# Patient Record
Sex: FEMALE | Race: WHITE | NOT HISPANIC OR LATINO | URBAN - METROPOLITAN AREA
[De-identification: names, ages, dates, MRNs, and addresses within clinical notes are randomized per-mention and may not be internally consistent; named-entity substitution may affect disease eponyms.]

---

## 2019-12-31 ENCOUNTER — EMERGENCY (EMERGENCY)
Facility: HOSPITAL | Age: 39
LOS: 1 days | Discharge: HOME | End: 2019-12-31
Attending: EMERGENCY MEDICINE | Admitting: EMERGENCY MEDICINE
Payer: MEDICAID

## 2019-12-31 VITALS
HEIGHT: 65 IN | TEMPERATURE: 98 F | RESPIRATION RATE: 18 BRPM | HEART RATE: 98 BPM | SYSTOLIC BLOOD PRESSURE: 128 MMHG | DIASTOLIC BLOOD PRESSURE: 84 MMHG | WEIGHT: 145.06 LBS

## 2019-12-31 PROCEDURE — 99284 EMERGENCY DEPT VISIT MOD MDM: CPT

## 2019-12-31 NOTE — ED PROVIDER NOTE - PATIENT PORTAL LINK FT
You can access the FollowMyHealth Patient Portal offered by [facility] by registering at the following website: [email or web address]. By joining [employer]’s FollowMyHealth portal, you will also be able to view your health information using other applications (apps) compatible with our system.
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
## 2020-01-01 VITALS
HEART RATE: 97 BPM | TEMPERATURE: 98 F | OXYGEN SATURATION: 98 % | DIASTOLIC BLOOD PRESSURE: 67 MMHG | RESPIRATION RATE: 18 BRPM | SYSTOLIC BLOOD PRESSURE: 102 MMHG

## 2020-01-07 DIAGNOSIS — R41.82 ALTERED MENTAL STATUS, UNSPECIFIED: ICD-10-CM

## 2020-01-07 DIAGNOSIS — F10.129 ALCOHOL ABUSE WITH INTOXICATION, UNSPECIFIED: ICD-10-CM
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
## 2023-12-14 ENCOUNTER — EMERGENCY (EMERGENCY)
Facility: HOSPITAL | Age: 43
LOS: 1 days | Discharge: HOME | End: 2023-12-14
Attending: EMERGENCY MEDICINE | Admitting: EMERGENCY MEDICINE
Payer: MEDICAID

## 2023-12-14 VITALS
DIASTOLIC BLOOD PRESSURE: 78 MMHG | RESPIRATION RATE: 18 BRPM | HEART RATE: 84 BPM | OXYGEN SATURATION: 98 % | SYSTOLIC BLOOD PRESSURE: 122 MMHG | TEMPERATURE: 98 F

## 2023-12-14 DIAGNOSIS — F10.120 ALCOHOL ABUSE WITH INTOXICATION, UNCOMPLICATED: ICD-10-CM

## 2023-12-14 DIAGNOSIS — R41.82 ALTERED MENTAL STATUS, UNSPECIFIED: ICD-10-CM

## 2023-12-14 PROCEDURE — 99283 EMERGENCY DEPT VISIT LOW MDM: CPT | Mod: 25

## 2023-12-14 NOTE — ED PROVIDER NOTE - NSFOLLOWUPINSTRUCTIONS_ED_ALL_ED_FT
Alcohol Intoxication    WHAT YOU NEED TO KNOW:    Alcohol intoxication is a harmful physical condition caused when you drink more alcohol than your body can handle. It is also called ethanol poisoning, or being drunk.    DISCHARGE INSTRUCTIONS:    Call your local emergency number (911 in the ) if:   •You have sudden trouble breathing or chest pain.      •You have a seizure.      •You feel sad enough to harm yourself or others.      Call your doctor if:   •You have hallucinations (you see or hear things that are not real).      •You cannot stop vomiting.      •You have questions or concerns about your condition or care.      Recommended alcohol limits:   •Men 21 to 64 years should limit alcohol to 2 drinks a day. Do not have more than 4 drinks in 1 day or more than 14 in 1 week.      •All women, and men 65 or older should limit alcohol to 1 drink in a day. Do not have more than 3 drinks in 1 day or more than 7 in 1 week. No amount of alcohol is okay during pregnancy.      Manage alcohol use:   •Decrease the amount you drink. This can help prevent health problems such as brain, heart, and liver damage, high blood pressure, diabetes, and cancer. If you cannot stop completely, healthcare providers can help you set goals to decrease the amount you drink.      •Plan weekly alcohol use. You will be less likely to drink more than the recommended limit if you plan ahead.      •Have food when you drink alcohol. Food will prevent alcohol from getting into your system too quickly. Eat before you have your first alcohol drink.      •Time your drinks carefully. Have no more than 1 drink in an hour. Have a liquid such as water, coffee, or a soft drink between alcohol drinks.      •Do not drive if you have had alcohol. Make sure someone who has not been drinking can help you get home.      •Do not drink alcohol if you are taking medicine. Alcohol is dangerous when you combine it with certain medicines, such as acetaminophen or blood pressure medicine. Talk to your healthcare provider about all the medicines you currently take.      For more information:   •Alcoholics Anonymous  Web Address: http://www.aa.org      •Substance Abuse and Mental Health Services Administration  [address]  [place] [address]  Web Address: http://www.[facility].gov        Follow up with your healthcare provider as directed: Write down your questions so you remember to ask them during your visits. Alcohol Intoxication    WHAT YOU NEED TO KNOW:    Alcohol intoxication is a harmful physical condition caused when you drink more alcohol than your body can handle. It is also called ethanol poisoning, or being drunk.    DISCHARGE INSTRUCTIONS:    Call your local emergency number (911 in the ) if:   •You have sudden trouble breathing or chest pain.      •You have a seizure.      •You feel sad enough to harm yourself or others.      Call your doctor if:   •You have hallucinations (you see or hear things that are not real).      •You cannot stop vomiting.      •You have questions or concerns about your condition or care.      Recommended alcohol limits:   •Men 21 to 64 years should limit alcohol to 2 drinks a day. Do not have more than 4 drinks in 1 day or more than 14 in 1 week.      •All women, and men 65 or older should limit alcohol to 1 drink in a day. Do not have more than 3 drinks in 1 day or more than 7 in 1 week. No amount of alcohol is okay during pregnancy.      Manage alcohol use:   •Decrease the amount you drink. This can help prevent health problems such as brain, heart, and liver damage, high blood pressure, diabetes, and cancer. If you cannot stop completely, healthcare providers can help you set goals to decrease the amount you drink.      •Plan weekly alcohol use. You will be less likely to drink more than the recommended limit if you plan ahead.      •Have food when you drink alcohol. Food will prevent alcohol from getting into your system too quickly. Eat before you have your first alcohol drink.      •Time your drinks carefully. Have no more than 1 drink in an hour. Have a liquid such as water, coffee, or a soft drink between alcohol drinks.      •Do not drive if you have had alcohol. Make sure someone who has not been drinking can help you get home.      •Do not drink alcohol if you are taking medicine. Alcohol is dangerous when you combine it with certain medicines, such as acetaminophen or blood pressure medicine. Talk to your healthcare provider about all the medicines you currently take.      For more information:   •Alcoholics Anonymous  Web Address: http://www.aa.org      •Substance Abuse and Mental Health Services Administration  [address]  [place] [address]  Web Address: http://www.[email or web address].[email or web address]        Follow up with your healthcare provider as directed: Write down your questions so you remember to ask them during your visits.

## 2023-12-14 NOTE — ED ADULT NURSE NOTE - NSFALLUNIVINTERV_ED_ALL_ED
Bed/Stretcher in lowest position, wheels locked, appropriate side rails in place/Call bell, personal items and telephone in reach/Instruct patient to call for assistance before getting out of bed/chair/stretcher/Non-slip footwear applied when patient is off stretcher/[place] to call system/Physically safe environment - no spills, clutter or unnecessary equipment/Purposeful proactive rounding/Room/bathroom lighting operational, light cord in reach Bed/Stretcher in lowest position, wheels locked, appropriate side rails in place/Call bell, personal items and telephone in reach/Instruct patient to call for assistance before getting out of bed/chair/stretcher/Non-slip footwear applied when patient is off stretcher/[place] to call system/Physically safe environment - no spills, clutter or unnecessary equipment/Purposeful proactive rounding/Room/bathroom lighting operational, light cord in reach

## 2023-12-14 NOTE — ED PROVIDER NOTE - PATIENT PORTAL LINK FT
You can access the FollowMyHealth Patient Portal offered by [facility] by registering at the following website: [email or web address]. By joining [employer]’s FollowMyHealth portal, you will also be able to view your health information using other applications (apps) compatible with our system. You can access the FollowMyHealth Patient Portal offered by [facility] by registering at the following website: [email or web address]. By joining [employer]’s FollowMyHealth portal, you will also be able to view your health information using other applications (apps) compatible with our system.

## 2023-12-14 NOTE — ED ADULT NURSE NOTE - CAS DISCH CONDITION
What Type Of Note Output Would You Prefer (Optional)?: Bullet Format Hpi Title: Evaluation of Skin Lesions How Severe Are Your Spot(S)?: mild Have Your Spot(S) Been Treated In The Past?: has not been treated Stable
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
## 2023-12-14 NOTE — ED PROVIDER NOTE - PHYSICAL EXAMINATION
Constitutional: awake and alert, in no acute distress  HEENT: head normocephalic and atraumatic. moist mucous membranes  Eyes: extraocular movements intact, normal conjunctiva  Neck: supple, normal ROM  Cardiovascular: regular rate   Pulmonary: no respiratory distress  Gastrointestinal: abdomen flat and nondistended  Skin: warm, dry, normal for ethnicity  Musculoskeletal: no edema, no deformity  Neurological: oriented x4, no focal neurologic deficit. ambulatory w steady gait  Psychiatric: calm and cooperative

## 2024-10-19 ENCOUNTER — EMERGENCY (EMERGENCY)
Facility: HOSPITAL | Age: 44
LOS: 1 days | Discharge: HOME | End: 2024-10-19
Admitting: EMERGENCY MEDICINE
Payer: MEDICAID

## 2024-10-19 VITALS
HEART RATE: 105 BPM | SYSTOLIC BLOOD PRESSURE: 117 MMHG | HEIGHT: 69 IN | OXYGEN SATURATION: 95 % | WEIGHT: 134.92 LBS | TEMPERATURE: 99 F | RESPIRATION RATE: 18 BRPM | DIASTOLIC BLOOD PRESSURE: 78 MMHG

## 2024-10-19 PROCEDURE — 70486 CT MAXILLOFACIAL W/O DYE: CPT | Mod: 26,MC

## 2024-10-19 PROCEDURE — 99284 EMERGENCY DEPT VISIT MOD MDM: CPT

## 2024-10-19 PROCEDURE — 70450 CT HEAD/BRAIN W/O DYE: CPT | Mod: 26,MC

## 2024-10-19 NOTE — ED ADULT NURSE NOTE - NSFALLUNIVINTERV_ED_ALL_ED
Bed/Stretcher in lowest position, wheels locked, appropriate side rails in place/Call bell, personal items and telephone in reach/Instruct patient to call for assistance before getting out of bed/chair/stretcher/Non-slip footwear applied when patient is off stretcher/[place] to call system/Physically safe environment - no spills, clutter or unnecessary equipment/Purposeful proactive rounding/Room/bathroom lighting operational, light cord in reach

## 2024-10-19 NOTE — ED ADULT TRIAGE NOTE - CHIEF COMPLAINT QUOTE
pt BIBA c/o left sided facial bruising after walking into a door, pt states it was dark and she didn't realize door was in front of her. pt BIBA c/o left sided facial bruising after walking into a door 2 days ago, pt states it was dark and she didn't realize door was in front of her.

## 2024-10-19 NOTE — ED PROVIDER NOTE - NSFOLLOWUPINSTRUCTIONS_ED_ALL_ED_FT
PLEASE FOLLOW-UP WITH YOUR PRIMARY CARE DOCTOR IN 1-2 DAYS FOR FURTHER EVALUATION.      PLEASE TAKE ALL PAPERWORK FROM TODAY'S VISIT TO YOUR PRIMARY DOCTOR.     IF YOU DO NOT HAVE A PRIMARY CARE DOCTOR PLEASE REFER TO THE OFFICE/CLINIC INFORMATION GIVEN BELOW:    If you do not have a doctor, you can call our referral line to find a doctor that matches your insurance; the number is [phone].     You can also follow up with clinics listed below, if you do not have a doctor:  [address] [address]  To make an appointment, call [phone]    [facility]  Address: [address] [place] [address]  Appointment Center: [phone] ([phone])     To access your record on the patient portal [facility], please visit:  [email or web address]  If you are having difficulties setting this up, call [phone] and someone can assist you over the phone.     PLEASE RETURN TO THE ER IMMEDIATELY OR CALL [phone] ANY HIGH FEVER, CHEST PAIN, TROUBLE BREATHING, VOMITING, SEVERE PAIN, OR ANY OTHER CONCERNS

## 2024-10-19 NOTE — ED PROVIDER NOTE - PHYSICAL EXAMINATION
CONSTITUTIONAL: Well-appearing; well-nourished; in no apparent distress.   	HEAD: Normocephalic; atraumatic.   	EYES:  conjunctiva and sclera clear. +left periorbital ecchymosis, no facial tenderness. +EOM Intact. good VA.   	ENT: normal nose; no rhinorrhea; normal pharynx with no erythema or lesions.   	NECK: Supple; non-tender;   	CARDIOVASCULAR: Normal S1, S2; no murmurs, rubs, or gallops. Regular rate and rhythm.   	RESPIRATORY: Breathing easily; breath sounds clear and equal bilaterally; no wheezes, rhonchi, or rales.  	GI: Soft; non-distended; non-tender  	EXT: CHAHAL x 4. normal gait.   	SKIN: Normal for age and race; warm; dry; good turgor; no apparent lesions or rash.   	NEURO: A & O x 3; face symmetric; grossly unremarkable.   PSYCHOLOGICAL: The patient’s mood and manner are appropriate.

## 2024-10-19 NOTE — ED PROVIDER NOTE - CLINICAL SUMMARY MEDICAL DECISION MAKING FREE TEXT BOX
s/p mechanical fall 2 days ago with left periorbital bruising most likely contusion, no neuro/motor deficits, will obtain ct brain r/o bleed, ct maxillofacial r/o fracture.     //ct imaging shows no acute bleed or fracture. we discussed supportive care, f/u pcp

## 2024-10-19 NOTE — ED ADULT NURSE NOTE - CHIEF COMPLAINT QUOTE
pt BIBA c/o left sided facial bruising after walking into a door 2 days ago, pt states it was dark and she didn't realize door was in front of her.

## 2024-10-19 NOTE — ED PROVIDER NOTE - PATIENT PORTAL LINK FT
You can access the FollowMyHealth Patient Portal offered by [facility] by registering at the following website: [email or web address]. By joining [employer]’s FollowMyHealth portal, you will also be able to view your health information using other applications (apps) compatible with our system.

## 2024-10-21 DIAGNOSIS — S00.12XA CONTUSION OF LEFT EYELID AND PERIOCULAR AREA, INITIAL ENCOUNTER: ICD-10-CM
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
## 2024-10-28 ENCOUNTER — INPATIENT (INPATIENT)
Facility: HOSPITAL | Age: 44
LOS: 2 days | Discharge: HOME | DRG: 433 | End: 2024-10-31
Attending: STUDENT IN AN ORGANIZED HEALTH CARE EDUCATION/TRAINING PROGRAM | Admitting: STUDENT IN AN ORGANIZED HEALTH CARE EDUCATION/TRAINING PROGRAM
Payer: MEDICAID

## 2024-10-28 VITALS
WEIGHT: 130.07 LBS | OXYGEN SATURATION: 98 % | RESPIRATION RATE: 16 BRPM | HEART RATE: 102 BPM | TEMPERATURE: 98 F | HEIGHT: 69 IN | DIASTOLIC BLOOD PRESSURE: 74 MMHG | SYSTOLIC BLOOD PRESSURE: 116 MMHG

## 2024-10-28 DIAGNOSIS — R07.9 CHEST PAIN, UNSPECIFIED: ICD-10-CM

## 2024-10-28 DIAGNOSIS — Z87.898 PERSONAL HISTORY OF OTHER SPECIFIED CONDITIONS: ICD-10-CM

## 2024-10-28 DIAGNOSIS — R79.89 OTHER SPECIFIED ABNORMAL FINDINGS OF BLOOD CHEMISTRY: ICD-10-CM

## 2024-10-28 DIAGNOSIS — Z29.9 ENCOUNTER FOR PROPHYLACTIC MEASURES, UNSPECIFIED: ICD-10-CM

## 2024-10-28 DIAGNOSIS — R74.8 ABNORMAL LEVELS OF OTHER SERUM ENZYMES: ICD-10-CM

## 2024-10-28 DIAGNOSIS — D64.9 ANEMIA, UNSPECIFIED: ICD-10-CM

## 2024-10-28 DIAGNOSIS — Z91.89 OTHER SPECIFIED PERSONAL RISK FACTORS, NOT ELSEWHERE CLASSIFIED: ICD-10-CM

## 2024-10-28 DIAGNOSIS — K74.60 UNSPECIFIED CIRRHOSIS OF LIVER: ICD-10-CM

## 2024-10-28 LAB
ADD ON TEST-SPECIMEN IN LAB: SIGNIFICANT CHANGE UP
ALBUMIN SERPL ELPH-MCNC: 2.4 G/DL — LOW (ref 3.4–5)
ALP SERPL-CCNC: 224 U/L — HIGH (ref 40–120)
ALT FLD-CCNC: 23 U/L — SIGNIFICANT CHANGE UP (ref 12–42)
ANION GAP SERPL CALC-SCNC: 7 MMOL/L — LOW (ref 9–16)
ANISOCYTOSIS BLD QL: SIGNIFICANT CHANGE UP
AST SERPL-CCNC: 153 U/L — HIGH (ref 15–37)
BASOPHILS # BLD AUTO: 0.13 K/UL — SIGNIFICANT CHANGE UP (ref 0–0.2)
BASOPHILS NFR BLD AUTO: 2 % — SIGNIFICANT CHANGE UP (ref 0–2)
BILIRUB SERPL-MCNC: 2.5 MG/DL — HIGH (ref 0.2–1.2)
BUN SERPL-MCNC: 4 MG/DL — LOW (ref 7–23)
CALCIUM SERPL-MCNC: 7.9 MG/DL — LOW (ref 8.5–10.5)
CHLORIDE SERPL-SCNC: 106 MMOL/L — SIGNIFICANT CHANGE UP (ref 96–108)
CO2 SERPL-SCNC: 28 MMOL/L — SIGNIFICANT CHANGE UP (ref 22–31)
CREAT SERPL-MCNC: 0.58 MG/DL — SIGNIFICANT CHANGE UP (ref 0.5–1.3)
D DIMER BLD IA.RAPID-MCNC: 1464 NG/ML DDU — HIGH
EGFR: 114 ML/MIN/1.73M2 — SIGNIFICANT CHANGE UP
EOSINOPHIL # BLD AUTO: 0.1 K/UL — SIGNIFICANT CHANGE UP (ref 0–0.5)
EOSINOPHIL NFR BLD AUTO: 1.6 % — SIGNIFICANT CHANGE UP (ref 0–6)
ETHANOL SERPL-MCNC: 472 MG/DL — HIGH
GLUCOSE SERPL-MCNC: 88 MG/DL — SIGNIFICANT CHANGE UP (ref 70–99)
HCG SERPL-ACNC: 5 MIU/ML — HIGH
HCT VFR BLD CALC: 30.8 % — LOW (ref 34.5–45)
HGB BLD-MCNC: 10.3 G/DL — LOW (ref 11.5–15.5)
IMM GRANULOCYTES NFR BLD AUTO: 0.2 % — SIGNIFICANT CHANGE UP (ref 0–0.9)
LIDOCAIN IGE QN: 94 U/L — HIGH (ref 16–77)
LYMPHOCYTES # BLD AUTO: 2.21 K/UL — SIGNIFICANT CHANGE UP (ref 1–3.3)
LYMPHOCYTES # BLD AUTO: 34.5 % — SIGNIFICANT CHANGE UP (ref 13–44)
MACROCYTES BLD QL: SIGNIFICANT CHANGE UP
MAGNESIUM SERPL-MCNC: 1.6 MG/DL — SIGNIFICANT CHANGE UP (ref 1.6–2.6)
MANUAL SMEAR VERIFICATION: SIGNIFICANT CHANGE UP
MCHC RBC-ENTMCNC: 33.4 GM/DL — SIGNIFICANT CHANGE UP (ref 32–36)
MCHC RBC-ENTMCNC: 37.5 PG — HIGH (ref 27–34)
MCV RBC AUTO: 112 FL — HIGH (ref 80–100)
MONOCYTES # BLD AUTO: 0.86 K/UL — SIGNIFICANT CHANGE UP (ref 0–0.9)
MONOCYTES NFR BLD AUTO: 13.4 % — SIGNIFICANT CHANGE UP (ref 2–14)
NEUTROPHILS # BLD AUTO: 3.09 K/UL — SIGNIFICANT CHANGE UP (ref 1.8–7.4)
NEUTROPHILS NFR BLD AUTO: 48.3 % — SIGNIFICANT CHANGE UP (ref 43–77)
NRBC # BLD: 0 /100 WBCS — SIGNIFICANT CHANGE UP (ref 0–0)
NT-PROBNP SERPL-SCNC: 54 PG/ML — SIGNIFICANT CHANGE UP
PLAT MORPH BLD: NORMAL — SIGNIFICANT CHANGE UP
PLATELET # BLD AUTO: 217 K/UL — SIGNIFICANT CHANGE UP (ref 150–400)
PLATELET COUNT - ESTIMATE: NORMAL — SIGNIFICANT CHANGE UP
POTASSIUM SERPL-MCNC: 3.6 MMOL/L — SIGNIFICANT CHANGE UP (ref 3.5–5.3)
POTASSIUM SERPL-SCNC: 3.6 MMOL/L — SIGNIFICANT CHANGE UP (ref 3.5–5.3)
PROT SERPL-MCNC: 7.6 G/DL — SIGNIFICANT CHANGE UP (ref 6.4–8.2)
RBC # BLD: 2.75 M/UL — LOW (ref 3.8–5.2)
RBC # FLD: 14 % — SIGNIFICANT CHANGE UP (ref 10.3–14.5)
RBC BLD AUTO: ABNORMAL
SODIUM SERPL-SCNC: 141 MMOL/L — SIGNIFICANT CHANGE UP (ref 132–145)
TROPONIN I, HIGH SENSITIVITY RESULT: 5 NG/L — SIGNIFICANT CHANGE UP
TROPONIN T, HIGH SENSITIVITY RESULT: 7 NG/L — SIGNIFICANT CHANGE UP (ref 0–51)
WBC # BLD: 6.4 K/UL — SIGNIFICANT CHANGE UP (ref 3.8–10.5)
WBC # FLD AUTO: 6.4 K/UL — SIGNIFICANT CHANGE UP (ref 3.8–10.5)

## 2024-10-28 PROCEDURE — 71275 CT ANGIOGRAPHY CHEST: CPT | Mod: 26,MC

## 2024-10-28 PROCEDURE — 99285 EMERGENCY DEPT VISIT HI MDM: CPT | Mod: 25

## 2024-10-28 PROCEDURE — 71046 X-RAY EXAM CHEST 2 VIEWS: CPT | Mod: 26

## 2024-10-28 PROCEDURE — 99223 1ST HOSP IP/OBS HIGH 75: CPT

## 2024-10-28 RX ORDER — SPIRONOLACTONE 100 MG
25 TABLET ORAL EVERY 24 HOURS
Refills: 0 | Status: DISCONTINUED | OUTPATIENT
Start: 2024-10-28 | End: 2024-10-29

## 2024-10-28 RX ORDER — B-COMPLEX WITH VITAMIN C
1 VIAL (ML) INJECTION DAILY
Refills: 0 | Status: DISCONTINUED | OUTPATIENT
Start: 2024-10-28 | End: 2024-10-31

## 2024-10-28 RX ORDER — CHLORDIAZEPOXIDE HCL 10 MG
50 CAPSULE ORAL ONCE
Refills: 0 | Status: DISCONTINUED | OUTPATIENT
Start: 2024-10-28 | End: 2024-10-28

## 2024-10-28 RX ORDER — FOLIC ACID 1 MG/1
1 TABLET ORAL ONCE
Refills: 0 | Status: COMPLETED | OUTPATIENT
Start: 2024-10-28 | End: 2024-10-28

## 2024-10-28 RX ORDER — ENOXAPARIN SODIUM 40MG/0.4ML
40 SYRINGE (ML) SUBCUTANEOUS EVERY 24 HOURS
Refills: 0 | Status: DISCONTINUED | OUTPATIENT
Start: 2024-10-28 | End: 2024-10-31

## 2024-10-28 RX ORDER — INFLUENZ VIR VAC TV P-SURF2003 15MCG/.5ML
0.5 SYRINGE (ML) INTRAMUSCULAR ONCE
Refills: 0 | Status: DISCONTINUED | OUTPATIENT
Start: 2024-10-28 | End: 2024-10-31

## 2024-10-28 RX ORDER — FUROSEMIDE 40 MG
20 TABLET ORAL EVERY 24 HOURS
Refills: 0 | Status: DISCONTINUED | OUTPATIENT
Start: 2024-10-28 | End: 2024-10-31

## 2024-10-28 RX ORDER — LORAZEPAM 2 MG
1 TABLET ORAL
Refills: 0 | DISCHARGE

## 2024-10-28 RX ORDER — LORAZEPAM 2 MG
1 TABLET ORAL AT BEDTIME
Refills: 0 | Status: DISCONTINUED | OUTPATIENT
Start: 2024-10-28 | End: 2024-10-31

## 2024-10-28 RX ORDER — KETOROLAC TROMETHAMINE 30 MG/ML
15 INJECTION INTRAMUSCULAR; INTRAVENOUS ONCE
Refills: 0 | Status: DISCONTINUED | OUTPATIENT
Start: 2024-10-28 | End: 2024-10-28

## 2024-10-28 RX ORDER — MAGNESIUM SULFATE IN 0.9% NACL 2 G/50 ML
2 INTRAVENOUS SOLUTION, PIGGYBACK (ML) INTRAVENOUS ONCE
Refills: 0 | Status: COMPLETED | OUTPATIENT
Start: 2024-10-28 | End: 2024-10-28

## 2024-10-28 RX ORDER — THIAMINE HCL 100 MG
500 TABLET ORAL EVERY 8 HOURS
Refills: 0 | Status: COMPLETED | OUTPATIENT
Start: 2024-10-28 | End: 2024-10-31

## 2024-10-28 RX ORDER — FOLIC ACID 1 MG/1
1 TABLET ORAL DAILY
Refills: 0 | Status: DISCONTINUED | OUTPATIENT
Start: 2024-10-28 | End: 2024-10-31

## 2024-10-28 RX ORDER — SODIUM CHLORIDE 9 MG/ML
1000 INJECTION, SOLUTION INTRAMUSCULAR; INTRAVENOUS; SUBCUTANEOUS ONCE
Refills: 0 | Status: COMPLETED | OUTPATIENT
Start: 2024-10-28 | End: 2024-10-28

## 2024-10-28 RX ORDER — THIAMINE HCL 100 MG
500 TABLET ORAL EVERY 8 HOURS
Refills: 0 | Status: DISCONTINUED | OUTPATIENT
Start: 2024-10-28 | End: 2024-10-28

## 2024-10-28 RX ORDER — FUROSEMIDE 40 MG
40 TABLET ORAL ONCE
Refills: 0 | Status: COMPLETED | OUTPATIENT
Start: 2024-10-28 | End: 2024-10-28

## 2024-10-28 RX ORDER — THIAMINE HCL 100 MG
100 TABLET ORAL ONCE
Refills: 0 | Status: COMPLETED | OUTPATIENT
Start: 2024-10-28 | End: 2024-10-28

## 2024-10-28 RX ADMIN — KETOROLAC TROMETHAMINE 15 MILLIGRAM(S): 30 INJECTION INTRAMUSCULAR; INTRAVENOUS at 04:26

## 2024-10-28 RX ADMIN — FOLIC ACID 1 MILLIGRAM(S): 1 TABLET ORAL at 06:27

## 2024-10-28 RX ADMIN — Medication 40 MILLIGRAM(S): at 06:26

## 2024-10-28 RX ADMIN — Medication 25 GRAM(S): at 22:02

## 2024-10-28 RX ADMIN — SODIUM CHLORIDE 1000 MILLILITER(S): 9 INJECTION, SOLUTION INTRAMUSCULAR; INTRAVENOUS; SUBCUTANEOUS at 09:31

## 2024-10-28 RX ADMIN — Medication 100 MILLIGRAM(S): at 06:26

## 2024-10-28 RX ADMIN — Medication 50 MILLIGRAM(S): at 09:42

## 2024-10-28 RX ADMIN — Medication 25 GRAM(S): at 06:30

## 2024-10-28 RX ADMIN — Medication 40 MILLIGRAM(S): at 22:03

## 2024-10-28 NOTE — H&P ADULT - PROBLEM SELECTOR PLAN 3
PMHx of alcoholic cirrhosis, on oral diuretics, AG-7, Alb-2.4, TBil-2.5, Alk Phos-224, AST-153, ALT-23,     Recommendations:  - Daily MELD labs (CMP, Coags)  - RUQ US with doppler  - Diagostic/Therapeutic paracentesis   - Diuretics: Spironolactone 100mg/Lasix 40mg  - Nutrition Consult  - High protein diet  - Low Na diet for ascites   - Immunizations: HBV, HAV, Pneumovax, Flu, Covid PMHx of alcoholic cirrhosis, on oral diuretics, AG-7, Alb-2.4, TBil-2.5, Alk Phos-224, AST-153, ALT-23, ascites on physical exam w/pleural effusion    Recommendations:  - Daily MELD labs (CMP, Coags)  - RUQ US with doppler  - Diagostic/Therapeutic paracentesis   - Diuretics: Spironolactone 100mg/Lasix 40mg  - Nutrition Consult  - High protein diet  - Low Na diet for ascites   - Immunizations: HBV, HAV, Pneumovax, Flu, Covid PMHx of alcoholic cirrhosis, on oral diuretics, AG-7, Alb-2.4, TBil-2.5, Alk Phos-224, AST-153, ALT-23, ascites on physical exam w/pleural effusion    Recommendations:  - Daily MELD labs (CMP, Coags)  - RUQ US with doppler  - Diuretics: Spironolactone 100mg/Lasix 40mg  - Nutrition Consult  - High protein diet  - Low Na diet for ascites PMHx of alcoholic cirrhosis, on oral diuretics, AG-7, Alb-2.4, TBil-2.5, Alk Phos-224, AST-153, ALT-23, ascites on physical exam w/pleural effusion    Recommendations:  - Daily MELD labs (CMP, Coags)  - Abdomen US with doppler  - Diuretics: Spironolactone 25mg/Lasix 20mg (home meds)  - Nutrition Consult  - High protein diet  - Low Na diet for ascites

## 2024-10-28 NOTE — H&P ADULT - NSHPLABSRESULTS_GEN_ALL_CORE
.  LABS:                         10.3   6.40  )-----------( 217      ( 28 Oct 2024 04:06 )             30.8     10-28    141  |  106  |  4[L]  ----------------------------<  88  3.6   |  28  |  0.58    Ca    7.9[L]      28 Oct 2024 04:06  Mg     1.6     10-28    TPro  7.6  /  Alb  2.4[L]  /  TBili  2.5[H]  /  DBili  x   /  AST  153[H]  /  ALT  23  /  AlkPhos  224[H]  10-28      Urinalysis Basic - ( 28 Oct 2024 04:06 )    Color: x / Appearance: x / SG: x / pH: x  Gluc: 88 mg/dL / Ketone: x  / Bili: x / Urobili: x   Blood: x / Protein: x / Nitrite: x   Leuk Esterase: x / RBC: x / WBC x   Sq Epi: x / Non Sq Epi: x / Bacteria: x                RADIOLOGY, EKG & ADDITIONAL TESTS: Reviewed.

## 2024-10-28 NOTE — ED PROVIDER NOTE - PROGRESS NOTE DETAILS
Patient is resting comfortably, NAD. No change in BP. I consulted with Dr. Colindres, MICU attending, who said patient is stable to regional medicine. Place on [facility] protocol, continue chlordiazepoxide, No more IVF. Endorsed to Dr. Yung, who accepted patient to regional medicine. Priscilla Kahn MD, attending physician. Patient signed out to me by Dr. Kerns awaiting CTA chest result. LIVE: blood pressure dropped after lasix. Will give IVF and reassess.

## 2024-10-28 NOTE — ED PROVIDER NOTE - CLINICAL SUMMARY MEDICAL DECISION MAKING FREE TEXT BOX
44-year-old female with history of EtOH abuse presents with pleuritic chest pain for 1 day.  On exam, patient is afebrile, satting in high 80s on room air, improves with 3 L O2 by nasal cannula.  No respiratory distress.  Abdomen soft, nontender, nondistended.  Patient has trace pitting edema in bilateral ankles.  Differential includes pleural effusions, pneumonia, PE, ACS, other.  Plan for labs, chest x-ray, monitor, reassess.    D-dimer elevated.  Will check CTA chest to rule out PE.  Chest x-ray shows right pleural effusion.  Will give Lasix.    EtOH level markedly elevated.  Patient does not appear intoxicated.  Will monitor closely for withdrawal.

## 2024-10-28 NOTE — ED ADULT TRIAGE NOTE - CHIEF COMPLAINT QUOTE
female patient bibems for eval of chest discomfort and pain upon breathing starting after waking up. patient endorses hx of chest tube placement and liver cirrhosis

## 2024-10-28 NOTE — PATIENT PROFILE ADULT - FALL HARM RISK - ATTEMPT OOB
Group Therapy Note    Date: 10/12/2023    Group Start Time: 11:00 AM  Group End Time: 11:50 AM  Group Topic: Process Group - Inpatient    [address] [facility]    [address] [facility], [address] [facility]        Group Therapy Note    Attendees: 10 scheduled    Focus of session as on ways to increase and improve self esteem. We spoke about the importance of focusing on strengths and what we can do rather than what we can't. We spoke about controlling our inner critic and make an effort to fight negative judgments and beliefs about ourselves and to not compare ourselves to others and what we think they are accomplishing or capable of since those are mostly assumptions. We spoke about setting reasonable goals and expectations and not over or under estimating what we are capable of and allow us to stretch our abilities. We spoke about solving problems versus avoiding them and relying first and foremost on our own opinion of ourselves.             Patient's Goal:  ***    Notes:  ***    Status After Intervention:  {Status After Intervention:025012918}    Participation Level: {Participation Level:472405962}    Participation Quality: {[facility] PARTICIPATION QUALITY:744626614}      Speech:  {ED  CD_SPEECH:34575}      Thought Process/Content: {Thought Process/Content:718718757}      Affective Functioning: {Affective Functionin}      Mood: {Mood:402233164}      Level of consciousness:  {Level of consciousness:780502511}      Response to [facility] Responses to Learnin}      Endings: {[facility] Endings:88842}    Modes of Intervention: {[facility] BHI Modes of Intervention:210927236}      Discipline Responsible: {[facility] Multidisciplinary:590735597}      Signature:  Peyman Rodriguez LCSW No

## 2024-10-28 NOTE — ED PROVIDER NOTE - CARE PLAN
Principal Discharge DX:	Pleural effusion   1 Principal Discharge DX:	Pleural effusion  Secondary Diagnosis:	Hypoxia

## 2024-10-28 NOTE — ED PROVIDER NOTE - WR ORDER NAME 1
Speech Acute Treatment                             .                       RECOMMENDATIONS:      1. Continue general diet/ thin liquids, no straws.  2. Meds whole in puree.  3. Constant supervision. Tray set-up and feeding assist likely needed. Sit fully upright for all PO intake.  4. Speech therapy to follow.      ASSESSMENT:  Patient was seen on 9T for communication and cognition treatment.    Administered MoCA version 7.2 for ongoing assessment of com/cog function. Deferred visuospatial/ executive function subtest due to pt's visual deficits.  Pt with 14/25 on MOCA version 7.2 (26 or more considered within functional limits). Breakdown as follows:   Visuospatial/executive: DID NOT TEST   Naming 3/3 (with simplification of visual field)   Delayed Memory 0/5 (4-5/5 immedidate recall)  Delayed recall improved to 1/5 with category cue; improved to 3/5 with multiple choice cue.  Attention 3/6   Language 2/3   Abstraction 2/2   Orientation 4/6   Previously on 8/14/17, pt scored 7/22 on MoCA 7.1 w/ deferral of visuospatial/ executive functions and naming subtests. Pt shows significant improvements in attention and language skills.    Pt/family education completed w/ pt and his wife regarding com/cog deficits and speech therapy role in rehabilitation of these skills.      Continued skilled service reasonable and necessary for com/cog intervention to improve safety and independence in ADLs, dysphagia intervention to assess for least restrictive diet.     Admitting complaint: EVAL FOR LVAD AND TRANSPLANT  Ischemic cardiomyopathy  EVAL FOR LVAD/TP  LEFT HEART FAILURE  ICMP  BiV failure, LVAD, Tandem  Anemia  Melena  melena/anemia  heart failure, LVAD patient     OBJECTIVE:  See below for current functional status overview.  See Speech flowsheets for full details regarding the speech therapy provided.      PLAN:   Plan for Next Session: Attempt to see in AMs around 10:45 on weekdays per established therapy schedule. Swallow:  advanced to general after trial meal tray on 8/22. Assess tolerance general/thin no straws, trial thin per straw as appropriate. Com/cog: scored 7/22 on MOCA 7.1 on 8/14, scored 14/25 on MoCA 7.2 on 8/25 (visual portions deferred). Deficits in memory, attention, orientation, numerics    SLP Identified Barriers to Discharge: com/cog deficits    Recommendations for Discharge: SLP: post acute speech therapy, NEETA    FREQUENCY:  Frequency: X see mid morning as possible, BID 0/1 on 8/31 general/thin, trial straws, d/c as appropriate; 0/3 on 9/1 com/cog. (8/24)    EDUCATION:   On this date, the patient and patient's spouse educated on plan of care.    The response to education: Verbalizes understanding and Needs reinforcement      Communication/Cognition Data Overview Last 24 hours       Subjective  Subjective Comments: Alert and cooperative. Wife present for session. (08/25/17 1545)    Observation  Observation Comments: NATE Oliver approved session (08/25/17 1545)    Behavior/Social Interaction  Arousal and Alertness: Appropriate responses to stimuli (08/25/17 1545)  Cooperates with Tasks: Intact (08/25/17 1545)  Maintains Eye Contact: Mild impairment (08/25/17 1545)  Pragmatics: Intact (08/25/17 1545)  Appropriate Behavior: Intact (08/25/17 1545)  Emotional Lability: Intact (08/25/17 1545)  Affect: Intact (08/25/17 1545)  Frustration Level: Intact (08/25/17 1545)    Verbal Expression  Initiation: No impairment (08/25/17 1545)  Indicates Needs - Gesturally/Verbally: Intact (08/25/17 1545)  Conversational Discourse: Intact (08/25/17 1545)  Confrontation: Intact (08/25/17 1545)  Generative Naming: Moderate impairment (08/25/17 1545)         Auditory Comprehension  Conversation - Simple: Intact (08/25/17 1545)    Visual Recognition  Visual Acuity: Wears glasses (08/25/17 1545)  Visual Recognition Comments: able to name 3 simple line drawings on MoCA 7.2 when visual field simplified (08/25/17 1545)               Attention  Sustained Attention: Mild impairment (08/25/17 1545)    Orientation Level  Oriented to Person: Yes (08/25/17 1545)  Oriented to Place: Yes (08/25/17 1545)  Oriented to Month: Yes (08/25/17 1545)  Oriented to Year: Yes (08/25/17 1545)  Oriented to Date: No (08/25/17 1545)  Oriented to Day of Week: No (08/25/17 1545)    Memory  Immediate Memory: Mild impairment (08/25/17 1545)  Delayed Memory: Severe impairment (08/25/17 1545)  Working Memory: Moderate impairment (08/25/17 1545)    Problem Solving  Simple Reasoning: Intact (08/25/17 1545)  Insight: Moderate impairment (08/25/17 1545)  Processing Speed: Mild impairment (08/25/17 1545)    Numerical Reasoning  Simple Calculations: Severe impairment (08/25/17 1545)      All Speech Therapy Goals:    SLP Goals  Short Term Goals to be Reviewed on : 09/01/17 (08/25/17 1600)  STG are the Same as Discharge Goals: No (08/25/17 1600)  Goal Agreement: Patient agrees with goals and treatment plan;Family/significant other/caregiver agrees (08/25/17 1600)    Attention Short Term Goal 1  Attention STG 1: Attend to simple task for 2 minutes w/ no more than 1 cue for redirection. (08/11/17 1630)  Attention Discharge Goal 1: improve attention to modified independent level (08/11/17 1630)  Attention Discharge Goal 1 Progress: Outcome met, complete goal (08/11/17 1630)    Memory Short Term Goal 1  Memory STG 1: Immediate recall of 1-2 units of info w/ 80% accuracy w/ min cues. (08/11/17 1630)  Memory Discharge Goal 1: improve memory to modified independent. (08/11/17 1630)  Memory Discharge Goal 1 Progress: Outcome met, complete goal (08/11/17 1630)    Auditory Comprehension Short Term Goal 1  Auditory Comprehension STG 1: follow simple 2-unit commands w/ 80% accuracy given mod cues. (08/11/17 1630)  Auditory Comprehension Discharge Goal 1: improve auditory comprehension to modified independent level. (08/11/17 1630)  Auditory Comprehension Discharge Goal 1 Progress: Outcome  met, complete goal (08/11/17 1630)       Swallowing Short Term Goal 1  Swallowing STG 1: tolerate easy chew solids/thin liquids per cup with <10% ss aspiration and adequate oral clearance (08/16/17 1600)  Swallowing Discharge Goal 1: Tolerate least restrictive oral diet (08/11/17 1630)  Swallowing Discharge Goal 1 Progress: Outcome met, complete goal (08/11/17 1630)    Swallowing Short Term Goal 2  Swallowing STG 2: Tolerate general trial solids with <10% SS aspiration and stable temps/lungs (08/16/17 1600)  Swallowing Discharge Goal 2: Tolerate least restrictive oral diet (08/16/17 1600)  Swallowing Discharge Goal 2 Progress: Outcome met, complete goal (07/28/17 1300)    Swallowing Short Term Goal 3  Swallowing STG 3: Tolerate upgrade trials of thin liquids per cup with <10% SS aspiration and stable temps/lungs (08/25/17 1600)  Swallowing Discharge Goal 3: Tolerate least restrictive oral diet (08/25/17 1600)  Swallowing Discharge Goal 3 Progress: Outcome met, complete goal (08/25/17 1600)    Swallowing Short Term Goal 4  Swallowing STG 4: pt will safely swallow trials of ground-minced solids w/ <10% SS of aspiration and adequate oral transfer/ clearance. (08/25/17 1600)  Swallowing Discharge Goal 4: safely consume least restrictive oral diet (08/25/17 1600)  Swallowing Discharge Goal 4 Progress: Outcome met, complete goal (08/25/17 1600)    Swallowing Short Term Goal 5  Swallowing STG 5: pt will safely swallow general diet/ thin liquids w/ <10% SS of aspiration. (08/25/17 1600)  Swallowing Discharge Goal 5: tolerate least restrictive diet (08/25/17 1600)    SLP Time Spent: 42 min (08/25/17 3067)             Xray Chest 2 Views PA/Lat

## 2024-10-28 NOTE — ED PROVIDER NOTE - OBJECTIVE STATEMENT
44-year-old female with history of EtOH abuse and cirrhosis presents with pleuritic chest pain for 1 day.  Patient states that she has had paracentesis and thoracentesis once before approximately 3 years ago, and she is concerned she has fluid in her lungs again.  Patient reports persistent daily drinking, states she only drinks wine and beer and only had 1 glass of wine today.  No cough or hemoptysis.  No exertional chest pain or shortness of breath.

## 2024-10-28 NOTE — H&P ADULT - PROBLEM SELECTOR PLAN 8
F: tolerating PO, no IVF  E: replete K<4, Mg<2  N: Regular    VTE Prophylaxis: Lovenox 40mg q24h  D: RMF

## 2024-10-28 NOTE — H&P ADULT - NSHPPHYSICALEXAM_GEN_ALL_CORE
.  VITAL SIGNS:  T(F): 97.4 (10-28-24 @ 18:25), Max: 98.4 (10-28-24 @ 13:05)  HR: 80 (10-28-24 @ 18:25) (76 - 102)  BP: 116/75 (10-28-24 @ 18:25) (90/55 - 116/75)  BP(mean): --  RR: 18 (10-28-24 @ 18:25) (16 - 22)  SpO2: 97% (10-28-24 @ 18:25) (94% - 98%)    PHYSICAL EXAM:    Constitutional: resting comfortably in bed; NAD  HEENT: NC/AT, PERRL, EOMI, anicteric sclera, no nasal discharge; uvula midline, no oropharyngeal erythema or exudates; MMM  Neck: supple; no JVD or thyromegaly  Respiratory: unlabored breathing, CTA B/L; no W/Rhonchi/Crackles, no retractions or use of accessory muscles   Cardiac: +S1/S2; RRR; no M/R/G; No ventricular heaves, PMI non-displaced  Gastrointestinal: soft, NT/ND; no rebound or guarding; +BSx4  Genitourinary: normal external genitalia  Back: spine midline, no bony tenderness or step-offs; no CVAT B/L  Extremities: WWP, no clubbing or cyanosis; no peripheral edema  Musculoskeletal: NROM x4; no joint swelling, tenderness or erythema  Vascular: 2+ radial, DP/PT pulses B/L  Dermatologic: skin warm, dry and intact; no rashes, wounds, or scars  Lymphatic: no submandibular or cervical LAD  Neurologic: AAOx3; CNII-XII grossly intact; no focal deficits  Psychiatric: affect and characteristics of appearance, verbalizations, behaviors are appropriate, denies SI/HI/AH/VH .  VITAL SIGNS:  T(F): 97.4 (10-28-24 @ 18:25), Max: 98.4 (10-28-24 @ 13:05)  HR: 80 (10-28-24 @ 18:25) (76 - 102)  BP: 116/75 (10-28-24 @ 18:25) (90/55 - 116/75)  BP(mean): --  RR: 18 (10-28-24 @ 18:25) (16 - 22)  SpO2: 97% (10-28-24 @ 18:25) (94% - 98%)    PHYSICAL EXAM:    Constitutional: resting comfortably in bed; NAD, CIWA 0  HEENT: L dhaval-orbital bruising, NC/AT, PERRL, EOMI, icteric sclera,  MMM  Neck: supple; no JVD or thyromegaly  Respiratory: R sided tenderness, R basilar decreased air entry, LLCTA  Cardiac: +S1/S2; RRR; no M/R/G; No ventricular heaves, PMI non-displaced  Gastrointestinal: Liver edge palpable, soft, NT/ND; no rebound or guarding; +BSx4, flank dullness to percussion, no asterixis  Extremities: WWP, no clubbing or cyanosis; peripheral edema 1+  Musculoskeletal: NROM x4; no joint swelling, tenderness or erythema  Vascular: 2+ radial, DP/PT pulses B/L  Dermatologic: skin warm, dry and intact; no rashes, wounds, or scars, no telengectasia, no spider angioma  Neurologic: AAOx3; CNII-XII grossly intact; no focal deficits

## 2024-10-28 NOTE — H&P ADULT - HISTORY OF PRESENT ILLNESS
HPI:        Of note,        Patient last admitted,      In the ED,  VS: T   , HR   , BP    , RR    , SpO2     % (RA/NC)  Labs:  Urine:  EKG:  CXR:  Imaging: Other  Orders:    HPI: 44-year-old female with history of EtOH abuse and cirrhosis presents with pleuritic chest pain for 1 day.  Patient states that she has had paracentesis and thoracentesis once before approximately 3 years ago, and she is concerned she has fluid in her lungs again.  Patient reports persistent daily drinking, states she only drinks wine and beer and only had 1 glass of wine today.  No cough or hemoptysis.          In the ED,  VS: T  97.4 , HR 80  , /75   , RR 18   , SpO2   97  % (2L O2 NC)  Labs: Hgb 10.3, MCV-112, Q-Bptnq-5253, AG-7, Alb-2.4, TBil-2.5, Alk Phos-224, AST-153, ALT-23, Lipase-94, blood alcohol-472  EKG:  CXR: R pleural effusion with right basilar   opacification compatible with atelectasis or pneumonia.  Imaging: CTPE neg for PE, Moderate right pleural effusion, Cirrhotic liver with indeterminate hypodense lesions. Trace ascites.    CT Head non con: neg for acute pathology  CT Maxillofacial non con: neg for acute pathology  Orders: Librium 50mg, folic acid 1g, lasix 40, toradol 15mg, thiamine, NS 1L   HPI: 44-year-old female with history of chronic bronchitis since childhood (on inhalers, rarely takes), anemia,  EtOH abuse (no h/o seizure, withdrawal, intubation)and cirrhosis presents with pleuritic chest pain for 1 day.  Patient states that she has had paracentesis and thoracentesis once before approximately 3 years ago, currently being managed with 'water pills' and she is concerned she has fluid in her lungs again.  Patient reports persistent daily drinking, states she only drinks wine and beer and only had 1 glass of wine yesterday evening.  No cough or hemoptysis.  Pt endorses bruising easily, recently moved apartment and bruised her arms and L eye by bumping in bathroom door. denies headache, palpitation, abdominal pain, cough, n/v/d.        In the ED,  VS: T  97.4 , HR 80  , /75   , RR 18   , SpO2   97  % (2L O2 NC)  Labs: Hgb 10.3, MCV-112, D-Pmdpi-5125, AG-7, Alb-2.4, TBil-2.5, Alk Phos-224, AST-153, ALT-23, Lipase-94, blood alcohol-472  EKG:  CXR: R pleural effusion with right basilar   opacification compatible with atelectasis or pneumonia.  Imaging: CTPE neg for PE, Moderate right pleural effusion, Cirrhotic liver with indeterminate hypodense lesions. Trace ascites.    CT Head non con: neg for acute pathology  CT Maxillofacial non con: neg for acute pathology  Orders: Librium 50mg, folic acid 1g, lasix 40, toradol 15mg, thiamine, NS 1L   HPI: 44-year-old female with history of chronic bronchitis since childhood (on inhalers, rarely takes), anemia,  EtOH abuse (no h/o seizure, withdrawal, intubation)and cirrhosis presents with pleuritic chest pain for 1 day.  Patient states that she has had paracentesis and thoracentesis once before approximately 3 years ago, currently being managed with 'water pills' and she is concerned she has fluid in her lungs again.  Patient reports persistent daily drinking, states she only drinks wine and beer and only had 1 glass of wine yesterday evening.  No cough or hemoptysis.  Pt endorses bruising easily, recently moved apartment and bruised her arms and L eye by bumping in bathroom door. denies headache, palpitation, abdominal pain, cough, n/v/d.        In the ED,  VS: T  97.4 , HR 80  , /75   , RR 18   , SpO2   97  % (2L O2 NC)  Labs: Hgb 10.3, MCV-112, N-Sbtal-2895, AG-7, Alb-2.4, TBil-2.5, Alk Phos-224, AST-153, ALT-23, Lipase-94, blood alcohol-472  EKG: NSR, TWI III  CXR: R pleural effusion with right basilar   opacification compatible with atelectasis or pneumonia.  Imaging: CTPE neg for PE, Moderate right pleural effusion, Cirrhotic liver with indeterminate hypodense lesions. Trace ascites.    CT Head non con: neg for acute pathology  CT Maxillofacial non con: neg for acute pathology  Orders: Librium 50mg, folic acid 1g, lasix 40, toradol 15mg, thiamine, NS 1L

## 2024-10-28 NOTE — H&P ADULT - ASSESSMENT
44F PMHx of chronic bronchitis( since childhood), alcohol use disorder, alcoholic cirrhosis, p/w pleuritic chest pain, found to have elevated blood alcohol level, admitted for CIWA monitoring).

## 2024-10-28 NOTE — H&P ADULT - ATTENDING COMMENTS
45 yo F with PMHx chronic bronchitis, anemia, etOH use, etOH cirrhosis, current active smoker BIBEMS with 1-2d hx of pleuritic chest pain, found to have elevated BAL level concerning for etOH intoxication with impending withdrawal and frequent etOH-related falls, admitted for further evaluation and management.     VSS. Initial HR 100s, improved on repeat. Placed on 2L NC for RR >20 for comfort. Suspect component of withdrawal vs anxiety. EKG reviewed (NSR, ). No prior EKG to compare. Labs reviewed. Troponin I negative x1. Hb 10.3 (). TBili 2.5. AlkPhos 224. AST//23. D-dimer 1464 prompting CTA Chest which demonstrated no PE however moderate R-sided pleural effusion and cirrhotic liver with hypodense lesions. CXR demonstrating known pleural effusion noted on CT Chest. BAL notable 472 however pt with consistently low CIWA on serial exams. Suspect chronic heavy etOH use. Pt reports only drinking 1 glass wine/day. ICU consulted from [facility] given elevated BAL with concern for impending withdrawal, deemed stable for RMF with no indication for telemetry or ICU level of care at this time. Exam notable for diffuse bruising for which pt reports frequent falls. Denies head trauma but cannot fully recall details of prior falls.      [ ] Wean O2 to RA as tolerated (Placed on 2L NC for comfort)    [ ] CTH NC (Frequent falls – unclear hx of head trauma)   [ ] US Abd Complete    - Continue home Spironolactone/Lasix   [ ] CIWA protocol (Ativan PRN for CIWA >8)   -  on admission (Monitor for withdrawal in next 24-72hr)   - MV, FA, Thiamine   [ ] UTox    [ ] I-STOP    - Prescribed Lorazepam 1mg Qhs for anxiety (Pt reports taking chronically)   [ ] Fall precautions   [ ] PT evaluation    [ ] SBIRT evaluation

## 2024-10-28 NOTE — H&P ADULT - PROBLEM SELECTOR PLAN 1
- Last drink yesterday (1glasss of wine), no pmhx of seizure, intubation, withdrawal, bld alcohol level 472  - CIWA protocol  - c/w libirum 50mg PO q8h  - c/w ativan 2mg PRN for CIWA > 8  - c/w thiamine 100mg PO daily  - c/w folate 1mg PO daily  - c/w MVI daily  - fall precautions  - SBIRT - Last drink yesterday (1glasss of wine), no pmhx of seizure, intubation, withdrawal, bld alcohol level 472  - CIWA protocol  - consider libirum 50mg PO q8h  - c/w ativan 2mg PRN for CIWA > 8  - c/w thiamine 500mg q8h  - c/w folate 1mg PO daily  - c/w MVI daily  - fall precautions  - SBIRT - Last drink yesterday (1glasss of wine), no pmhx of seizure, intubation, withdrawal, bld alcohol level 472  - CIWA protocol  - consider libirum 50mg PO q8h   - c/w ativan 2mg PRN for CIWA > 8  - c/w thiamine 500mg q8h  - c/w folate 1mg PO daily  - c/w MVI daily  - fall precautions  - SBIRT - Last drink yesterday (1glasss of wine), no pmhx of seizure, intubation, withdrawal, bld alcohol level 472  - CIWA protocol  - consider libirum 50mg PO q8h   - c/w ativan 2mg PRN for CIWA > 8  - c/w thiamine 500mg q8h  - c/w folate 1mg PO daily  - c/w MVI daily  - fall precautions  - SBIRT  - UTox  - Serum drug levels

## 2024-10-28 NOTE — CHART NOTE - NSCHARTNOTEFT_GEN_A_CORE
Prescription Information      PDI Filter:    PDI	My Rx	Current Rx	Drug Type	Rx Written	Rx Dispensed	Drug	Quantity	Days Supply	Prescriber Name	Prescriber ROSIE #	Payment Method	Dispenser      A	N	N	B	08/30/2024	09/07/2024	lorazepam 1 mg tablet	30	30	Peter, Gladys OJEDA	[id number]	Medicaid	[id number]      A	N	N	B	07/01/2024	07/03/2024	lorazepam 1 mg tablet	30	30	[id number]      A	N	N	B	05/23/2024	05/23/2024	lorazepam 1 mg tablet	30	30	[id number]      A	N	N	B	04/01/2024	04/01/2024	lorazepam 1 mg tablet	30	30	[id number]      A	N	N	B	02/05/2024	02/06/2024	lorazepam 1 mg tablet	30	30	[id number]	[place]	[id number]      A	N	N	B	12/11/2023	12/15/2023	lorazepam 1 mg tablet	30	30	[id number]

## 2024-10-28 NOTE — H&P ADULT - NSICDXPASTMEDICALHX_GEN_ALL_CORE_FT
PAST MEDICAL HISTORY:  Anxiety     Chronic bronchitis     Cirrhosis     ETOHism     Tobacco dependence

## 2024-10-28 NOTE — H&P ADULT - PROBLEM SELECTOR PLAN 7
F: tolerating PO, no IVF  E: replete K<4, Mg<2  N: Regular    VTE Prophylaxis: Lovenox 40mg q24h  D: RMF Home meds: Lorazepam 1mg HS PRN for anxiety  - continue home meds

## 2024-10-28 NOTE — ED ADULT NURSE NOTE - OBJECTIVE STATEMENT
Patient states having chest discomfort when taking deep breaths. Hx of chronic bronchitis and liver cirrhosis

## 2024-10-28 NOTE — H&P ADULT - PROBLEM SELECTOR PLAN 6
F: tolerating PO, no IVF  E: replete K<4, Mg<2  N: Regular    VTE Prophylaxis: Lovenox 40mg q24h  D: RMF Home meds: Lorazepam 1mg PRN for anxiety  - contineu if needed Home meds: Lorazepam 1mg HS PRN for anxiety  - continue home meds Hgb 10.3, MCV-112  - f/u  b12, folate  - Maintain active type/screen, Transfuse if hgb <7

## 2024-10-28 NOTE — PATIENT PROFILE ADULT - LEGAL HELP
Daily Note     Today's date: 2024  Patient name: Susana Brady  : 1959  MRN: [id number]  Referring provider: Familia Patricia MD  Dx:   Encounter Diagnosis     ICD-10-CM    1. Spinal stenosis of lumbar region with neurogenic claudication  M48.062       2. Lumbar radiculopathy  M54.16                      Subjective: Patient reports that her balance is bad today.       Objective: See treatment diary below      Assessment: Tolerated treatment fairly well today with program outlined below, increased resistance with UE ex adding green paddles focusing on core activation. No c/o pain with TE.  Patient demonstrated fatigue post treatment and would benefit from continued PT      Plan: Patient at this time will be D/C from therapy due to she will away for a month, patient will return in  with a new script.      POC expires Unit limit Auth Expiration date PT/OT + Visit Limit?   24 4 NA 75                           Visit/Unit Tracking  AUTH Status:  Date 4/3 4/10 4/11 4/17 4/19 4/22 4/24        No AUTH Used 1  3 4 5 6 7         Remaining                 FOTO                     Precautions: MS, HTN, DM, cardiac, neurogenic bladder      Daily Treatment Diary     Date 4/3 4/10 4/11 4/17 4/19 4/22 4/24    Patient education 10 5 5        Water Walk (FW, BW, side) x10’  2' 5' w/ N 5 5' 10' 10' 10'    LE work at wall               Hip FF/ext swing  2' 2 2 2 2' 2 2      Toe/heel  1'  1 1 1 1      Hip ABD/ADD  1 1 2 2' 2 2       1 1 1' 1 1      Knee flexion & extension  1 1 1 1' 1 1      Squats  1 1 1 1' 1 1    UE noodle x3             Push/pull   1 1 1 1' 1       Rotate   1 1 1 1' 1 1      Row forward & back   2 small N 2 2 2' 2 2      OH side bend            UE/Core (AROM, paddles, MB)      AROM AROM grn      ABD/ADD      1' 1 1      Horizontal Pec Fly      1' 1 1      Alt. arm swing (shld flex/ext)      1' 1 1      Push pull        1      Single paddle rotation           SLS (EO, EC, ball  "toss)            Aqua Step (FW, lat, eccentric)            Core work:              MF press (red, blue, blk)             Kickboard press (blue, red)              Row/ext w/ tubing (red, blue, blk)           Seated on bench             ankle DF/PF      1' 1 1      March   1 1 1 1' 1 1      ABD/ADD   1 1 1 1' 1 1      Knee flexion/extension   1 1 1 1' 1 1    DW TX - hang in the deep water  3' 5' 10 10' 10 10' 10 10      DW ABD/ADD   1 1 1 1' 1 1      DW Bicycle  3' 4 5 5 5' 5' 5      DW Scissor hip flexion/extension              DW DKTC   1 1 1 1' 1 1    Stretches              HS at step   30''ea 30\"/each 2x 2x 2x 2x      Wall stretches  20\"/4x 20''x4 5x 5x 10\"x3 3x10\" 10\"x3      Calf stretch at wall              Other:            Hot Tub - 10 minutes only  NO no nt 2' 4'  4' 4'                        " no

## 2024-10-28 NOTE — H&P ADULT - PROBLEM SELECTOR PLAN 4
F: tolerating PO, no IVF  E: replete K<4, Mg<2  N: (DIET)    VTE Prophylaxis: Lovenox 40mg q24h  GI: not needed  C: Full Code  D: (LOCATION) [id number], CTA neg for PE  -Most like elevated iso cirrhosis

## 2024-10-28 NOTE — ED ADULT NURSE REASSESSMENT NOTE - NS ED NURSE REASSESS COMMENT FT1
Pt received from CRISTA Swenson. Pt is A&Ox4 at this time and reports no additional complaints at this time. Pain is controlled. Denies exacerbation of chest pain, dizziness at this time. BP 98/61; Dr. Kahn made aware. Plan of care discussed.
Patient placed on 2L/NC and cardiac monitor

## 2024-10-29 DIAGNOSIS — J90 PLEURAL EFFUSION, NOT ELSEWHERE CLASSIFIED: ICD-10-CM

## 2024-10-29 DIAGNOSIS — E83.39 OTHER DISORDERS OF PHOSPHORUS METABOLISM: ICD-10-CM

## 2024-10-29 DIAGNOSIS — E87.6 HYPOKALEMIA: ICD-10-CM

## 2024-10-29 LAB
ALBUMIN SERPL ELPH-MCNC: 3 G/DL — LOW (ref 3.3–5)
ALP SERPL-CCNC: 245 U/L — HIGH (ref 40–120)
ALT FLD-CCNC: 18 U/L — SIGNIFICANT CHANGE UP (ref 10–45)
ANION GAP SERPL CALC-SCNC: 11 MMOL/L — SIGNIFICANT CHANGE UP (ref 5–17)
APTT BLD: 38.5 SEC — HIGH (ref 24.5–35.6)
AST SERPL-CCNC: 136 U/L — HIGH (ref 10–40)
BASOPHILS # BLD AUTO: 0.11 K/UL — SIGNIFICANT CHANGE UP (ref 0–0.2)
BASOPHILS NFR BLD AUTO: 2.5 % — HIGH (ref 0–2)
BILIRUB SERPL-MCNC: 3.6 MG/DL — HIGH (ref 0.2–1.2)
BLD GP AB SCN SERPL QL: NEGATIVE — SIGNIFICANT CHANGE UP
BUN SERPL-MCNC: 4 MG/DL — LOW (ref 7–23)
CALCIUM SERPL-MCNC: 8.1 MG/DL — LOW (ref 8.4–10.5)
CHLORIDE SERPL-SCNC: 100 MMOL/L — SIGNIFICANT CHANGE UP (ref 96–108)
CO2 SERPL-SCNC: 25 MMOL/L — SIGNIFICANT CHANGE UP (ref 22–31)
CREAT SERPL-MCNC: 0.37 MG/DL — LOW (ref 0.5–1.3)
EGFR: 127 ML/MIN/1.73M2 — SIGNIFICANT CHANGE UP
EOSINOPHIL # BLD AUTO: 0.04 K/UL — SIGNIFICANT CHANGE UP (ref 0–0.5)
EOSINOPHIL NFR BLD AUTO: 0.9 % — SIGNIFICANT CHANGE UP (ref 0–6)
FOLATE SERPL-MCNC: 8.9 NG/ML — SIGNIFICANT CHANGE UP
GLUCOSE SERPL-MCNC: 86 MG/DL — SIGNIFICANT CHANGE UP (ref 70–99)
HCT VFR BLD CALC: 34.1 % — LOW (ref 34.5–45)
HGB BLD-MCNC: 10.9 G/DL — LOW (ref 11.5–15.5)
IMM GRANULOCYTES NFR BLD AUTO: 0.2 % — SIGNIFICANT CHANGE UP (ref 0–0.9)
INR BLD: 1.41 — HIGH (ref 0.85–1.16)
LYMPHOCYTES # BLD AUTO: 1.16 K/UL — SIGNIFICANT CHANGE UP (ref 1–3.3)
LYMPHOCYTES # BLD AUTO: 26.1 % — SIGNIFICANT CHANGE UP (ref 13–44)
MAGNESIUM SERPL-MCNC: 2.2 MG/DL — SIGNIFICANT CHANGE UP (ref 1.6–2.6)
MCHC RBC-ENTMCNC: 32 GM/DL — SIGNIFICANT CHANGE UP (ref 32–36)
MCHC RBC-ENTMCNC: 35.9 PG — HIGH (ref 27–34)
MCV RBC AUTO: 112.2 FL — HIGH (ref 80–100)
MONOCYTES # BLD AUTO: 0.58 K/UL — SIGNIFICANT CHANGE UP (ref 0–0.9)
MONOCYTES NFR BLD AUTO: 13.1 % — SIGNIFICANT CHANGE UP (ref 2–14)
NEUTROPHILS # BLD AUTO: 2.54 K/UL — SIGNIFICANT CHANGE UP (ref 1.8–7.4)
NEUTROPHILS NFR BLD AUTO: 57.2 % — SIGNIFICANT CHANGE UP (ref 43–77)
NRBC # BLD: 0 /100 WBCS — SIGNIFICANT CHANGE UP (ref 0–0)
PHOSPHATE SERPL-MCNC: 2 MG/DL — LOW (ref 2.5–4.5)
PLATELET # BLD AUTO: 167 K/UL — SIGNIFICANT CHANGE UP (ref 150–400)
POTASSIUM SERPL-MCNC: 3.4 MMOL/L — LOW (ref 3.5–5.3)
POTASSIUM SERPL-SCNC: 3.4 MMOL/L — LOW (ref 3.5–5.3)
PROT SERPL-MCNC: 7.7 G/DL — SIGNIFICANT CHANGE UP (ref 6–8.3)
PROTHROM AB SERPL-ACNC: 16.2 SEC — HIGH (ref 9.9–13.4)
RBC # BLD: 3.04 M/UL — LOW (ref 3.8–5.2)
RBC # FLD: 13.3 % — SIGNIFICANT CHANGE UP (ref 10.3–14.5)
RH IG SCN BLD-IMP: POSITIVE — SIGNIFICANT CHANGE UP
SODIUM SERPL-SCNC: 136 MMOL/L — SIGNIFICANT CHANGE UP (ref 135–145)
VIT B12 SERPL-MCNC: 489 PG/ML — SIGNIFICANT CHANGE UP (ref 232–1245)
WBC # BLD: 4.44 K/UL — SIGNIFICANT CHANGE UP (ref 3.8–10.5)
WBC # FLD AUTO: 4.44 K/UL — SIGNIFICANT CHANGE UP (ref 3.8–10.5)

## 2024-10-29 PROCEDURE — 93306 TTE W/DOPPLER COMPLETE: CPT | Mod: 26

## 2024-10-29 PROCEDURE — 99223 1ST HOSP IP/OBS HIGH 75: CPT | Mod: 25

## 2024-10-29 PROCEDURE — 70450 CT HEAD/BRAIN W/O DYE: CPT | Mod: 26

## 2024-10-29 PROCEDURE — 99233 SBSQ HOSP IP/OBS HIGH 50: CPT | Mod: GC

## 2024-10-29 PROCEDURE — 76604 US EXAM CHEST: CPT | Mod: 26

## 2024-10-29 PROCEDURE — 93975 VASCULAR STUDY: CPT | Mod: 26

## 2024-10-29 RX ORDER — SPIRONOLACTONE 100 MG
50 TABLET ORAL EVERY 24 HOURS
Refills: 0 | Status: DISCONTINUED | OUTPATIENT
Start: 2024-10-29 | End: 2024-10-31

## 2024-10-29 RX ORDER — DIBASIC SODIUM PHOSPHATE, MONOBASIC POTASSIUM PHOSPHATE AND MONOBASIC SODIUM PHOSPHATE 852; 155; 130 MG/1; MG/1; MG/1
1 TABLET ORAL ONCE
Refills: 0 | Status: COMPLETED | OUTPATIENT
Start: 2024-10-29 | End: 2024-10-29

## 2024-10-29 RX ADMIN — Medication 50 MILLIGRAM(S): at 22:22

## 2024-10-29 RX ADMIN — Medication 105 MILLIGRAM(S): at 14:12

## 2024-10-29 RX ADMIN — DIBASIC SODIUM PHOSPHATE, MONOBASIC POTASSIUM PHOSPHATE AND MONOBASIC SODIUM PHOSPHATE 1 PACKET(S): 852; 155; 130 TABLET ORAL at 07:58

## 2024-10-29 RX ADMIN — Medication 105 MILLIGRAM(S): at 10:42

## 2024-10-29 RX ADMIN — Medication 1 TABLET(S): at 12:06

## 2024-10-29 RX ADMIN — Medication 20 MILLIGRAM(S): at 05:36

## 2024-10-29 RX ADMIN — Medication 105 MILLIGRAM(S): at 01:00

## 2024-10-29 RX ADMIN — FOLIC ACID 1 MILLIGRAM(S): 1 TABLET ORAL at 12:06

## 2024-10-29 RX ADMIN — Medication 105 MILLIGRAM(S): at 22:22

## 2024-10-29 RX ADMIN — Medication 40 MILLIGRAM(S): at 22:22

## 2024-10-29 RX ADMIN — Medication 25 MILLIGRAM(S): at 05:36

## 2024-10-29 NOTE — PROGRESS NOTE ADULT - PROBLEM SELECTOR PLAN 6
Hgb 10.3, MCV-112  - f/u  b12, folate  - Maintain active type/screen, Transfuse if hgb <7 PMH: No history of pancreatitis  Presentation: no epigastric pain radiating to back, no signs on imaging  Lipase- 94    Plan:  - Does not meet criteria for pancreatitis; no need to monitor

## 2024-10-29 NOTE — CONSULT NOTE ADULT - SUBJECTIVE AND OBJECTIVE BOX
PULMONARY SERVICE INITIAL CONSULT NOTE    HPI:  44-year-old female with history of chronic bronchitis since childhood (on inhalers, rarely takes), anemia,  EtOH abuse (no h/o seizure, withdrawal, intubation)and cirrhosis presents with pleuritic chest pain for 1 day.  Patient states that she has had paracentesis and thoracentesis once before approximately 3 years ago, currently being managed with 'water pills' and she is concerned she has fluid in her lungs again.  Patient reports persistent daily drinking, states she only drinks wine and beer and only had 1 glass of wine yesterday evening.  No cough or hemoptysis.  Pt endorses bruising easily, recently moved apartment and bruised her arms and L eye by bumping in bathroom door. denies headache, palpitation, abdominal pain, cough, n/v/d.    Additional pulmonary history:  Patient notes she had a chest tube placed about 2 years ago with [facility], and it was felt that the effusion was related to liver disease. Has not had any repeat procedures since then. Does feel dyspnea but improving with diuretics. She denies fevers, chills, productive cough, weight loss. She does not have a pulmonologist.     CXR: R pleural effusion with right basilar opacification compatible with atelectasis or pneumonia.  CTPE neg for PE, Moderate right pleural effusion, Cirrhotic liver with indeterminate hypodense lesions. Trace ascites.  CT Head non con: neg for acute pathology  CT Maxillofacial non con: neg for acute pathology    REVIEW OF SYSTEMS:  Constitutional: No fever, weight loss or fatigue  Eyes: No eye pain, visual disturbances, or discharge  ENMT:  No difficulty hearing, tinnitus, vertigo; No sinus or throat pain  Neck: No pain, stiffness or neck swelling  Respiratory: see HPI  Cardiovascular: as above  Gastrointestinal: No abdominal or epigastric pain. No nausea, vomiting or hematemesis; No diarrhea or constipation. No melena or hematochezia.  Genitourinary: No dysuria, frequency, hematuria or incontinence  Neurological: No headaches, memory loss, loss of strength, numbness or tremors    PAST MEDICAL & SURGICAL HISTORY:  Cirrhosis      ETOHism      Anxiety      Chronic bronchitis      Tobacco dependence      No significant past surgical history          FAMILY HISTORY:  No pertinent family history in first degree relatives        SOCIAL HISTORY:  Smoking Status: [ ] Current, [ ] Former, [ ] Never  Pack Years:    MEDICATIONS:  Pulmonary:    Antimicrobials:    Anticoagulants:  enoxaparin Injectable 40 milliGRAM(s) SubCutaneous every 24 hours    Onc:    GI/:    Endocrine:    Cardiac:  furosemide    Tablet 20 milliGRAM(s) Oral every 24 hours  spironolactone 50 milliGRAM(s) Oral every 24 hours    Other Medications:  folic acid 1 milliGRAM(s) Oral daily  influenza   Vaccine 0.5 milliLiter(s) IntraMuscular once  LORazepam     Tablet 1 milliGRAM(s) Oral at bedtime PRN  multivitamin 1 Tablet(s) Oral daily  thiamine IVPB 500 milliGRAM(s) IV Intermittent every 8 hours      Allergies    penicillins (Rash; Urticaria)    Intolerances        Vital Signs Last 24 Hrs  T(C): 36.6 (29 Oct 2024 09:38), Max: 36.8 (29 Oct 2024 05:22)  T(F): 97.8 (29 Oct 2024 09:38), Max: 98.2 (29 Oct 2024 05:22)  HR: 87 (29 Oct 2024 09:38) (80 - 96)  BP: 134/75 (29 Oct 2024 09:38) (99/62 - 134/75)  BP(mean): --  RR: 18 (29 Oct 2024 09:38) (18 - 18)  SpO2: 92% (29 Oct 2024 09:38) (92% - 97%)    Parameters below as of 29 Oct 2024 09:38  Patient On (Oxygen Delivery Method): room air            PHYSICAL EXAM:  Constitutional: well-appearing  Head: NC/AT  EENT: PERRL, anicteric sclera; oropharynx clear, MMM  Neck: supple, no appreciable JVD  Respiratory: decreased air entry at R base  Cardiovascular: +S1/S2, RRR  Gastrointestinal: soft, NT/ND  Extremities: WWP; no edema, clubbing or cyanosis  Vascular: 2+ radial pulses B/L  Neurological: awake and alert; CHAHAL    LABS:      CBC Full  -  ( 29 Oct 2024 05:30 )  WBC Count : 4.44 K/uL  RBC Count : 3.04 M/uL  Hemoglobin : 10.9 g/dL  Hematocrit : 34.1 %  Platelet Count - Automated : 167 K/uL  Mean Cell Volume : 112.2 fl  Mean Cell Hemoglobin : 35.9 pg  Mean Cell Hemoglobin Concentration : 32.0 gm/dL  Auto Neutrophil # : 2.54 K/uL  Auto Lymphocyte # : 1.16 K/uL  Auto Monocyte # : 0.58 K/uL  Auto Eosinophil # : 0.04 K/uL  Auto Basophil # : 0.11 K/uL  Auto Neutrophil % : 57.2 %  Auto Lymphocyte % : 26.1 %  Auto Monocyte % : 13.1 %  Auto Eosinophil % : 0.9 %  Auto Basophil % : 2.5 %    10-29    136  |  100  |  4[L]  ----------------------------<  86  3.4[L]   |  25  |  0.37[L]    Ca    8.1[L]      29 Oct 2024 05:30  Phos  2.0     10-29  Mg     2.2     10-29    TPro  7.7  /  Alb  3.0[L]  /  TBili  3.6[H]  /  DBili  x   /  AST  136[H]  /  ALT  18  /  AlkPhos  245[H]  10-29    PT/INR - ( 29 Oct 2024 05:30 )   PT: 16.2 sec;   INR: 1.41          PTT - ( 29 Oct 2024 05:30 )  PTT:38.5 sec      Urinalysis Basic - ( 29 Oct 2024 05:30 )    Color: x / Appearance: x / SG: x / pH: x  Gluc: 86 mg/dL / Ketone: x  / Bili: x / Urobili: x   Blood: x / Protein: x / Nitrite: x   Leuk Esterase: x / RBC: x / WBC x   Sq Epi: x / Non Sq Epi: x / Bacteria: x                RADIOLOGY & ADDITIONAL STUDIES:

## 2024-10-29 NOTE — PROGRESS NOTE ADULT - PROBLEM SELECTOR PLAN 9
Presentation: K 3.6 in ED, down to 3.4 after admission  Etiology: could be secondary to refeeding syndrome    Plan:  - Nutrition following, should consider to d/c oral nutrition supplements and add Consistent Carbohydrate to diet order if refeeding syndrome suspected  - Potassium phosphate / sodium phosphate Powder one packet today  - Replete as needed  - Continue to monitor

## 2024-10-29 NOTE — DISCHARGE NOTE PROVIDER - HOSPITAL COURSE
#Discharge: do not delete    JULIUS RODRIGUEZ is a 44-year-old female with history of chronic bronchitis since childhood (on inhalers, rarely takes), anemia,  EtOH abuse (no h/o seizure, withdrawal, intubation)and cirrhosis presents with pleuritic chest pain for 1 day.  Patient states that she has had paracentesis and thoracentesis once before approximately 3 years ago, currently being managed with 'water pills' and she is concerned she has fluid in her lungs again.  Patient reports persistent daily drinking, states she only drinks wine and beer and only had 1 glass of wine yesterday evening.  No cough or hemoptysis.  Pt endorses bruising easily, recently moved apartment and bruised her arms and L eye by bumping in bathroom door. denies headache, palpitation, abdominal pain, cough, n/v/d. Pt drank 1 glass of wine yesterday per pt and blood alcohol level was 472, pt was admitted for CIWA monitoring and received ...................ativan and ......................librium. On phyical exam, pt had R chest wall tenderness, decreased air entry on R basilar posterior lung field, palpable liver edge, flank dullness R>L, shifting dullness, w/ bruising of all 4 limbs and periorbital bruising. Given CT findings of Moderate R pleural effusion and trace ascites, pulmonology and hepatology were consulted and recommended.............................................. Home dose of spironoloactone was inceased from 25mg to 50mg and home furosemide 20mg qwas continued.    Problem List/Main Diagnoses (system-based):   # Pleuritic Chest Pain:  Summary: The patient, a 44-year-old female with a history of chronic bronchitis, presented with pleuritic chest pain. Imaging revealed a moderate right pleural effusion and right basilar opacification. A CT scan ruled out pulmonary embolism.  Assessment/Plan: Managed with diuretics (Lasix) to address the pleural effusion. Continue monitoring respiratory symptoms and follow up with a pulmonologist as needed.    #Alcohol Use Disorder and Associated Complications:  Summary: The patient has a history of alcohol abuse and cirrhosis, with a blood alcohol level of 472. She reported persistent daily drinking.  Assessment/Plan: Initiated treatment with Librium for alcohol withdrawal prophylaxis, thiamine, and folic acid supplementation. Encouraged to seek support for alcohol cessation, including referral to addiction services.    #Cirrhosis and Ascites:  Summary: Known cirrhosis with trace ascites observed on imaging. Liver function tests showed elevated bilirubin, AST, and alkaline phosphatase.  Assessment/Plan: Continue management with diuretics and monitor liver function. Follow up with hepatology for ongoing management of cirrhosis.    #Macrocytic Anemia:  Summary: Anemia with elevated mean corpuscular volume (), likely related to alcohol use and nutritional deficiencies.  Assessment/Plan: Folic acid and thiamine supplementation started. Monitor hemoglobin levels and consider further evaluation for other causes of macrocytic anemia if no improvement.    #Bruising and Coagulation Concerns:  Summary: The patient presented with extensive bruising and ecchymosis due to likely coagulopathy associated with liver disease.  Assessment/Plan: Avoidance of NSAIDs and careful monitoring of bleeding. Further evaluation of coagulation status may be necessary.      New medications:   Labs to be followed outpatient:   Exam to be followed outpatient:       LABS & STUDIES:   #Discharge: do not delete    JULIUS RODRIGUEZ is a 44-year-old female with history of chronic bronchitis since childhood (on inhalers, rarely takes), anemia,  EtOH abuse (no h/o seizure, withdrawal, intubation)and cirrhosis presents with pleuritic chest pain for 1 day.  Patient states that she has had paracentesis and thoracentesis once before approximately 3 years ago, currently being managed with 'water pills' and she is concerned she has fluid in her lungs again.  Patient reports persistent daily drinking, states she only drinks wine and beer and only had 1 glass of wine yesterday evening.  No cough or hemoptysis.  Pt endorses bruising easily, recently moved apartment and bruised her arms and L eye by bumping in bathroom door. denies headache, palpitation, abdominal pain, cough, n/v/d. Pt drank 1 glass of wine yesterday per pt and blood alcohol level was 472, pt was admitted for CIWA monitoring and received ...................ativan. On phyical exam, pt had R chest wall tenderness, decreased air entry on R basilar posterior lung field, palpable liver edge, flank dullness R>L, shifting dullness, w/ bruising of all 4 limbs and periorbital bruising. Given CT findings of Moderate R pleural effusion and trace ascites, pulmonology and hepatology were consulted and recommended.............................................. Home dose of spironoloactone was inceased from 25mg to 50mg and home furosemide 20mg qwas continued.    Problem List/Main Diagnoses (system-based):   # Pleuritic Chest Pain:  Summary: The patient, a 44-year-old female with a history of chronic bronchitis, presented with pleuritic chest pain. Imaging revealed a moderate right pleural effusion and right basilar opacification. A CT scan ruled out pulmonary embolism.  Assessment/Plan: Managed with diuretics (Lasix) to address the pleural effusion. Continue monitoring respiratory symptoms and follow up with a pulmonologist as needed.    #Alcohol Use Disorder and Associated Complications:  Summary: The patient has a history of alcohol abuse and cirrhosis, with a blood alcohol level of 472. She reported persistent daily drinking.  Assessment/Plan: Initiated treatment with Librium for alcohol withdrawal prophylaxis, thiamine, and folic acid supplementation. Encouraged to seek support for alcohol cessation, including referral to addiction services.    #Cirrhosis and Ascites:  Summary: Known cirrhosis with trace ascites observed on imaging. Liver function tests showed elevated bilirubin, AST, and alkaline phosphatase.  Assessment/Plan: Continue management with diuretics and monitor liver function. Follow up with hepatology for ongoing management of cirrhosis.    #Macrocytic Anemia:  Summary: Anemia with elevated mean corpuscular volume (), likely related to alcohol use and nutritional deficiencies.  Assessment/Plan: Folic acid and thiamine supplementation started. Monitor hemoglobin levels and consider further evaluation for other causes of macrocytic anemia if no improvement.    #Bruising and Coagulation Concerns:  Summary: The patient presented with extensive bruising and ecchymosis due to likely coagulopathy associated with liver disease.  Assessment/Plan: Avoidance of NSAIDs and careful monitoring of bleeding. Further evaluation of coagulation status may be necessary.      New medications:   Labs to be followed outpatient:   Exam to be followed outpatient:       LABS & STUDIES:   #Discharge: do not delete    JULIUS RODRIGUEZ is a 44-year-old female with history of chronic bronchitis since childhood (on inhalers, rarely uses), anemia, EtOH abuse (no h/o seizure, withdrawal, intubation) and cirrhosis presented with right-sided pleuritic chest pain for 1 day.  Patient states that she has had paracentesis and thoracentesis once before approximately 3 years ago, currently being managed with Lasix and Spironolactone (follows with GI in [place]) and she is concerned she has fluid in her lungs again.  Patient reports drinking 1-2 glasses of wine every other day and with her last drink on 10/27 at night. No cough or hemoptysis.  Pt endorses bruising easily, recently moved apartment and bruised her arms and L eye by bumping into bathroom door. Denies headache, palpitation, abdominal pain, cough, n/v/d. Blood alcohol level was 472 on admission and she was admitted for CIWA monitoring and received librium once.     On physical exam, pt had R chest wall tenderness, decreased air entry on R basilar posterior lung field, palpable liver edge, flank dullness R>L, shifting dullness, w/ bruising of all 4 limbs and periorbital bruising. Given CT findings of moderate right pleural effusion and trace ascites, pulmonology and hepatology were consulted.    Problem List/Main Diagnoses (system-based):   #Liver cirrhosis.   PMH of cirrhosis and most recent thoracentesis and paracentesis were approximately 3 years ago. She is managed on Spironolactone 25mg and Lasix 20mg and follows with  Dr. Bonita Esquivel at [facility] [place]. Reports her most recent EGD was 3-4 months ago and negative for varices. Denies history of SBP. Physical examination was significant for R>L flank dullness to percussion with shifting dullness, distended abdomen, and a palpable, nontender liver edge at the right costal margin. CXR on admission revealed small to moderate right pleural effusion with right basilar opacification compatible with atelectasis or pneumonia. ECHO showed normal systolic function with good LV and RV function. Pulmonology was consulted and performed a thoracentesis draining 1.1L of fluid, which provided symptomatic relief to the patient. Hepatology was consulted and recommended abdominal imaging as well as increasing home Spironolactone to 50mg. Abdominal US revealed bidirectional flow in the main portal vein suggestive of portal hypertension. Hepatofugal flow in the left portal vein. No evidence of flow in the right portal vein. Thrombosis of the right portal vein cannot be excluded. She was scheduled for CT abd/pelvis with contrast however this was cancelled d/t hCG of 5. As the patient was not medically optimized s/p thoracentesis, hepatology recommended that the patient be scheduled for outpatient liver MRI as well as meet with outpatient GI as soon as possible to evaluate for portal vein thrombosis and r/o malignancy.   - Follow up with outpatient GI Dr Esquivel 11/1/24 at 11:30am  - Schedule liver MRI with your outpatient GI doctor, Dr Esquivel     AFP 9.4  Labs on admission: Albumin 2.4, Total bilirubin 2.5, Alk phos 224, , ALT 23, BECKY 472  Labs on discharge: Albumin 3.1, Total bilirubin 4.3, Alk phos 229, , ALT 14    # Moderate right pleural effusion  History of chronic bronchitis, presented with right-sided pleuritic chest pain. Pulmonary exam revealed decreased breath sounds at the middle and lower right posterior lung. CXR revealed a moderate right pleural effusion and right basilar opacification. CT angio ruled out pulmonary embolism. Managed with home Lasix and spironolactone. Pulmonology was consulted who recommended thoracentesis. Thoracentesis was performed which drained 1.1L of hazy yellow fluid. The patient reported immediate symptomatic relief s/p procedure and denies SOB, dyspnea, and hemoptysis. Post-thoracentesis POCUS revealed pleural effusion size appears reduced with appropriate lung sliding on the right anterior chest wall. ECHO was ordered to r/o cardiovascular etiology and showed normal left and right ventricular size and systolic function with no sign of valvular disease.    Pleural fluid markers: , Protein 3.7, Albumin 1.7, Amylase 40, Glucose 104  Pleural fluid analysis: Slightly hazy appearance, Color: yellow, Total nucleated cell count: 252, Total RBC count < 2000, Neutrophils 7, BF Lymphocytes 87, monocyte/macrophage 6, pH 7.73, Triglycerides 82    #Alcohol Use Disorder and Associated Complications:  Summary: The patient has a history of alcohol abuse and cirrhosis, with a blood alcohol level of 472. She reported persistent daily drinking.  Assessment/Plan: Initiated treatment with Librium for alcohol withdrawal prophylaxis, thiamine, and folic acid supplementation. Encouraged to seek support for alcohol cessation, including referral to addiction services.        #Macrocytic Anemia:  Summary: Anemia with elevated mean corpuscular volume (), likely related to alcohol use and nutritional deficiencies.  Assessment/Plan: Folic acid and thiamine supplementation started. Monitor hemoglobin levels and consider further evaluation for other causes of macrocytic anemia if no improvement.    #Bruising and Coagulation Concerns:  Summary: The patient presented with extensive bruising and ecchymosis due to likely coagulopathy associated with liver disease.  Assessment/Plan: Avoidance of NSAIDs and careful monitoring of bleeding. Further evaluation of coagulation status may be necessary.      New medications:   Labs to be followed outpatient:   Exam to be followed outpatient:       LABS & STUDIES:   #Discharge: do not delete    JULIUS RODRIGUEZ is a 44-year-old female with history of chronic bronchitis since childhood (on inhalers, rarely uses), anemia, EtOH abuse (no h/o seizure, withdrawal, intubation) and cirrhosis presented with right-sided pleuritic chest pain for 1 day.  Patient states that she has had paracentesis and thoracentesis once before approximately 3 years ago, currently being managed with Lasix and Spironolactone (follows with GI in [place]) and she is concerned she has fluid in her lungs again.  Patient reports drinking 1-2 glasses of wine every other day and with her last drink on 10/27 at night. No cough or hemoptysis.  Pt endorses bruising easily, recently moved apartment and bruised her arms and L eye by bumping into bathroom door. Denies headache, palpitation, abdominal pain, cough, n/v/d. Blood alcohol level was 472 on admission and she was admitted for CIWA monitoring and received librium once.     On physical exam, pt had R chest wall tenderness, decreased air entry on R basilar posterior lung field, palpable liver edge, flank dullness R>L, shifting dullness, w/ bruising of all 4 limbs and periorbital bruising. Given CT findings of moderate right pleural effusion and trace ascites, pulmonology and hepatology were consulted.    Problem List/Main Diagnoses (system-based):   #Liver cirrhosis.   PMH of cirrhosis and most recent thoracentesis and paracentesis were approximately 3 years ago. She is managed on Spironolactone 25mg and Lasix 20mg and follows with  Dr. Bonita Esquivel at [facility] [place]. Reports her most recent EGD was 3-4 months ago and negative for varices. Denies history of SBP. Physical examination was significant for R>L flank dullness to percussion with shifting dullness, distended abdomen, and a palpable, nontender liver edge at the right costal margin. CXR on admission revealed small to moderate right pleural effusion with right basilar opacification compatible with atelectasis or pneumonia. ECHO showed normal systolic function with good LV and RV function. Pulmonology was consulted and performed a thoracentesis draining 1.1L of fluid, which provided symptomatic relief to the patient. Hepatology was consulted and recommended abdominal imaging as well as increasing home Spironolactone to 50mg. Abdominal US revealed bidirectional flow in the main portal vein suggestive of portal hypertension. Hepatofugal flow in the left portal vein. No evidence of flow in the right portal vein. Thrombosis of the right portal vein cannot be excluded. She was scheduled for CT abd/pelvis with contrast however this was cancelled d/t hCG of 5. As the patient was not medically optimized s/p thoracentesis, hepatology recommended that the patient be scheduled for outpatient liver MRI as well as meet with outpatient GI as soon as possible to evaluate for portal vein thrombosis and r/o malignancy.   - Follow up with outpatient GI Dr Esquivel 11/1/24 at 11:30am  - Schedule liver MRI with your outpatient GI doctor, Dr Esquivel   - Continue Spironolactone 50mg daily and Lasix 20mg daily    AFP 9.4  Labs on admission: Albumin 2.4, Total bilirubin 2.5, Alk phos 224, , ALT 23, BECKY 472  Labs on discharge: Albumin 3.1, Total bilirubin 4.3, Alk phos 229, , ALT 14    # Moderate right pleural effusion  History of chronic bronchitis, presented with right-sided pleuritic chest pain. Pulmonary exam revealed decreased breath sounds at the middle and lower right posterior lung. CXR revealed a moderate right pleural effusion and right basilar opacification. CT angio ruled out pulmonary embolism. Managed with home Lasix and spironolactone. Pulmonology was consulted who recommended thoracentesis. Thoracentesis was performed which drained 1.1L of hazy yellow fluid. The patient reported immediate symptomatic relief s/p procedure and denies SOB, dyspnea, and hemoptysis. Post-thoracentesis POCUS revealed pleural effusion size appears reduced with appropriate lung sliding on the right anterior chest wall. ECHO was ordered to r/o cardiovascular etiology and showed normal left and right ventricular size and systolic function with no sign of valvular disease.    Pleural fluid markers: , Protein 3.7, Albumin 1.7, Amylase 40, Glucose 104  Pleural fluid analysis: Slightly hazy appearance, Color: yellow, Total nucleated cell count: 252, Total RBC count < 2000, Neutrophils 7, BF Lymphocytes 87, monocyte/macrophage 6, pH 7.73, Triglycerides 82    #Alcohol Use Disorder and Associated Complications:  Summary: The patient has a history of alcohol abuse and cirrhosis, with a blood alcohol level of 472. She reported persistent daily drinking.  Assessment/Plan: Initiated treatment with Librium for alcohol withdrawal prophylaxis, thiamine, and folic acid supplementation. Encouraged to seek support for alcohol cessation, including referral to addiction services.        #Macrocytic Anemia:  Summary: Anemia with elevated mean corpuscular volume (), likely related to alcohol use and nutritional deficiencies.  Assessment/Plan: Folic acid and thiamine supplementation started. Monitor hemoglobin levels and consider further evaluation for other causes of macrocytic anemia if no improvement.    #Bruising and Coagulation Concerns:  Summary: The patient presented with extensive bruising and ecchymosis due to likely coagulopathy associated with liver disease.  Assessment/Plan: Avoidance of NSAIDs and careful monitoring of bleeding. Further evaluation of coagulation status may be necessary.      New medications:   Labs to be followed outpatient:   Exam to be followed outpatient:       LABS & STUDIES:   #Discharge: do not delete    JULIUS RODRIGUEZ is a 44-year-old female with history of chronic bronchitis since childhood (on inhalers, rarely uses), anemia, EtOH abuse (no h/o seizure, withdrawal, intubation) and cirrhosis presented with right-sided pleuritic chest pain for 1 day.  Patient states that she has had paracentesis and thoracentesis once before approximately 3 years ago, currently being managed with Lasix and Spironolactone (follows with GI in [place]) and she is concerned she has fluid in her lungs again.  Patient reports drinking 1-2 glasses of wine every other day and with her last drink on 10/27 at night. No cough or hemoptysis.  Pt endorses bruising easily, recently moved apartment and bruised her arms and L eye by bumping into bathroom door. Denies headache, palpitation, abdominal pain, cough, n/v/d. Blood alcohol level was 472 on admission and she was admitted for CIWA monitoring and received librium once.     Problem List/Main Diagnoses (system-based):   #Liver cirrhosis  PMH of cirrhosis and most recent thoracentesis and paracentesis were approximately 3 years ago. She is managed on Spironolactone 25mg and Lasix 20mg and follows with  Dr. Bonita Esquivel at [facility] [place]. Reports her most recent EGD was 3-4 months ago and negative for varices. Denies history of SBP. Physical examination was significant for bruising of all 4 limbs and L periorbital bruising, R>L flank dullness to percussion with shifting dullness, distended abdomen, and a palpable, nontender liver edge at the right costal margin. CXR on admission revealed small to moderate right pleural effusion with right basilar opacification compatible with atelectasis or pneumonia. ECHO showed normal systolic function with good LV and RV function. Pulmonology was consulted and performed a thoracentesis draining 1.1L of fluid, which provided symptomatic relief to the patient. Hepatology was consulted and recommended abdominal imaging as well as increasing home Spironolactone to 50mg. Abdominal US revealed bidirectional flow in the main portal vein suggestive of portal hypertension. Hepatofugal flow in the left portal vein. No evidence of flow in the right portal vein. Thrombosis of the right portal vein cannot be excluded. She was scheduled for CT abd/pelvis with contrast however this was cancelled d/t hCG of 5. As the patient was not medically optimized s/p thoracentesis, hepatology recommended that the patient be scheduled for outpatient liver MRI as well as meet with outpatient GI as soon as possible to evaluate for portal vein thrombosis and r/o malignancy.  - Follow up with outpatient GI Dr Esquivel 11/1/24 at 11:30am  - Schedule liver MRI with your outpatient GI doctor, Dr Esquivel   - Continue Spironolactone 50mg daily and Lasix 20mg daily    AFP 9.4  Labs on admission: Albumin 2.4, Total bilirubin 2.5, Alk phos 224, , ALT 23, BECKY 472  Labs on discharge: Albumin 3.1, Total bilirubin 4.3, Alk phos 229, , ALT 14    # Moderate right pleural effusion  History of chronic bronchitis, presented with right-sided pleuritic chest pain. Pulmonary exam revealed decreased breath sounds at the middle and lower right posterior lung. CXR revealed a moderate right pleural effusion and right basilar opacification. CT angio ruled out pulmonary embolism. Managed with home Lasix and spironolactone. Pulmonology was consulted who recommended thoracentesis. Thoracentesis was performed which drained 1.1L of hazy yellow fluid. The patient reported immediate symptomatic relief s/p procedure and denies SOB, dyspnea, and hemoptysis. Post-thoracentesis POCUS revealed pleural effusion size appears reduced with appropriate lung sliding on the right anterior chest wall. ECHO was ordered to r/o cardiovascular etiology and showed normal left and right ventricular size and systolic function with no sign of valvular disease.  - Continue taking Lasix 20mg daily    Pleural fluid markers: , Protein 3.7, Albumin 1.7, Amylase 40, Glucose 104  Pleural fluid analysis: Slightly hazy appearance, Color: yellow, Total nucleated cell count: 252, Total RBC count < 2000, Neutrophils 7, BF Lymphocytes 87, monocyte/macrophage 6, pH 7.73, Triglycerides 82    #Alcohol Use Disorder and Associated Complications  The patient has a history of alcohol abuse and cirrhosis, with a blood alcohol level of 472 on admission. She reported persistent daily-every other day drinking. Initiated treatment with Librium for alcohol withdrawal prophylaxis; the patient scored 0 on CIWA throughout hospitalization and did not require treatment. No signs of acute withdrawal. Was given thiamine and folic acid supplementation during stay. Encouraged to seek support for alcohol cessation, including referral to addiction services.  - Follow up with outpatient GI  - Alcohol cessation    #Macrocytic Anemia:  Anemia with elevated mean corpuscular volume (), likely related to alcohol use and nutritional deficiencies. Given folic acid and thiamine supplementation started. Monitor hemoglobin levels and consider further evaluation for other causes of macrocytic anemia if no improvement. No symptoms of anemia or blood loss.  - Follow up with outpatient GI     Hgb during hospitalization: 10.3 (10/28), 10.9 (10/29), 10.9 (10/31)  MCV during hospitalization: 112 (10/28), 112.2 (10/29), 114.5 (10/31)  Folate 8.9    #Bruising and Coagulation Concerns  The patient presented with extensive bruising and ecchymosis due to likely coagulopathy associated with liver disease. Avoidance of NSAIDs and careful monitoring of bleeding. Further evaluation of coagulation status may be necessary. CT head revealed no acute pathology.  - Follow up with outpatient GI for cirrhosis management    PT/INR: 16.2/1.41 (10/29), 20.2/1.77 (10/31)    # Right-sided chest pain - resolved  R-sided pleuritic CP without radiation, no palpitations; no significant cardiac history. Likely secondary to pressure/fluid buildup due to right pleural effusion. Pain significantly improved s/p thoracentesis with pulmonology and drainage of 1.1L of fluids.     EKG in ED (10/28): TWI in Lead III, Qtc-474.   EKG repeat: TWI in Lead III.   Trop 5 to 7.    # Hypokalemia - resolved  Potassium was 3.6 upon presentation to the ED. Per hepatology, replete > 4 in the setting of cirrhosis and Lasix/Spirinolactone use. Required repletion twice however stabilized after patient began eating and hydrating well.  - Follow up outpatient GI    # Anxiety  History of anxiety, takes lorazepam 1m HS PRN.  - Continue home medication    # Elevated d-dimer  D-dimer 1464 in ED. No history of pulmonary embolism or DVT. Not on anticoagulation. CT angio chest negative for PE.     # Elevated lipase  Lipase 94 in ED. History of pancreatitis. No epigastric pain, no pain radiating to the back, no signs on imaging.     New medications: Spironolactone 50mg daily  Labs to be followed outpatient: AST, ALT, Bilirubin, Alk phos, PT, INR  Exam to be followed outpatient: Liver CT or MRI    CXR: Moderate size right pleural effusion. No left pleural effusion noted. No pneumothorax. No acute infiltrates appearing compared to prior exam   10/28/2024.    CT angio chest: No pulmonary embolism. Moderate right pleural effusion with atelectatic changes. Cirrhotic liver with indeterminate hypodense lesions. Trace ascites.   Consider follow-up MRI.    Abdominal US: 1. Bidirectional flow in the main portal vein suggestive of portal hypertension. Hepatofugal flow in the left portal vein. No evidence of   flow in the right portal vein. Thrombosis of the right portal vein cannot be excluded. Consider contrast enhanced CT for further evaluation.  These findings were discussed with Dr. Phipps  10/30/2024 8:43 AM by Dr. Vergara with read back confirmation. 2. Coarse echotexture and nodular contour of the liver, consistent with known cirrhosis. Borderline hepatomegaly. #Discharge: do not delete    JULIUS RODRIGUEZ is a 44-year-old female with history of chronic bronchitis since childhood (on inhalers, rarely uses), anemia, EtOH abuse (no h/o seizure, withdrawal, intubation) and cirrhosis presented with right-sided pleuritic chest pain for 1 day.  Patient states that she has had paracentesis and thoracentesis once before approximately 3 years ago, currently being managed with Lasix and Spironolactone (follows with GI in [place]) although ptn reports does not take it daily as prescribed at home, was found to have moderate R side pleural effusion on CT on admission.  Patient reports drinking 1-2 glasses of wine every other day and with her last drink on 10/27 at night. No cough or hemoptysis.  Pt endorses bruising easily, recently moved apartment and bruised her arms and L eye by bumping into bathroom door. Denies headache, palpitation, abdominal pain, cough, n/v/d. Blood alcohol level was 472 on admission, ptn admitted for pleural effusion and CIWA monitoring, received librium once and did not score further on CIWA.     Problem List/Main Diagnoses (system-based):   #Liver cirrhosis  PMH of cirrhosis and most recent thoracentesis and paracentesis were approximately 3 years ago. She is managed on Spironolactone 25mg and Lasix 20mg and follows with  Dr. Bonita Esquivel at [facility] [place]. Reports her most recent EGD was 3-4 months ago and negative for varices. Denies history of SBP. Physical examination was significant for bruising of all 4 limbs and L periorbital bruising, ptn A+Ox4 and soft, nontender, nondistended abdomen. CXR on admission revealed small to moderate right pleural effusion with right basilar opacification compatible with atelectasis or pneumonia. ECHO showed normal systolic function with good LV and RV function. Pulmonology was consulted and performed a thoracentesis draining 1.1L of fluid, which provided symptomatic relief to the patient. Hepatology was consulted and recommended abdominal imaging as well as increasing home Spironolactone to 50mg. Abdominal US revealed no ascites, bidirectional flow in the main portal vein suggestive of portal hypertension. Hepatofugal flow in the left portal vein. No evidence of flow in the right portal vein. Thrombosis of the right portal vein cannot be excluded, per hepatology can be further evaluated outptn w MRI, which can also assess for malignancy.  Ptn AFP marker elevated at 9.4.  - Follow up with outpatient GI Dr Esquivel 11/1/24 at 11:30am  - Schedule expedited liver MRI with your outpatient GI doctor, Dr Esquivel   - Continue Spironolactone 50mg daily and Lasix 20mg daily    AFP 9.4  Labs on admission: Albumin 2.4, Total bilirubin 2.5, Alk phos 224, , ALT 23, BECKY 472  Labs on discharge: Albumin 3.1, Total bilirubin 4.3, Alk phos 229, , ALT 14    # Moderate right pleural effusion  History of chronic bronchitis, presented with right-sided pleuritic chest pain. Pulmonary exam revealed decreased breath sounds at the middle and lower right posterior lung. CXR revealed a moderate right pleural effusion, negative for PE on CT angio.  Managed with home Lasix and spironolactone. Pulmonology was consulted, thoracentesis performed w 1.1L fluid drainaged, Light's criteria borderline but per LDH levels, fluid is transudative, likely 2/2 cirrhosis.  The patient reported immediate symptomatic relief s/p procedure and denies SOB, dyspnea, and hemoptysis. Post-thoracentesis POCUS revealed pleural effusion size appears reduced with appropriate lung sliding on the right anterior chest wall.   - Continue taking Lasix 20mg and spironolactine 50mg daily as above    Pleural fluid markers: , Protein 3.7, Albumin 1.7, Amylase 40, Glucose 104  Pleural fluid analysis: Slightly hazy appearance, Color: yellow, Total nucleated cell count: 252, Total RBC count < 2000, Neutrophils 7, BF Lymphocytes 87, monocyte/macrophage 6, pH 7.73, Triglycerides 82    #Alcohol Use Disorder and Associated Complications  The patient has a history of alcohol abuse and cirrhosis, with a blood alcohol level of 472 on admission. She reported persistent daily-every other day drinking. Initiated treatment with Librium for alcohol withdrawal prophylaxis; the patient scored 0 on CIWA throughout hospitalization and did not require treatment. No signs of acute withdrawal. Was given thiamine and folic acid supplementation during stay. Encouraged to seek support for alcohol cessation, including referral to addiction services.  - Follow up with outpatient GI  - Alcohol cessation    #Macrocytic Anemia:  Anemia with elevated mean corpuscular volume (), likely related to alcohol use and nutritional deficiencies. Given folic acid and thiamine supplementation started. Monitor hemoglobin levels and consider further evaluation for other causes of macrocytic anemia if no improvement. No symptoms of anemia or blood loss.  - Follow up with outpatient GI     Hgb during hospitalization: 10.3 (10/28), 10.9 (10/29), 10.9 (10/31)  MCV during hospitalization: 112 (10/28), 112.2 (10/29), 114.5 (10/31)  Folate 8.9    #Bruising and Coagulation Concerns  The patient presented with extensive bruising and ecchymosis due to likely coagulopathy associated with liver disease. Avoidance of NSAIDs and careful monitoring of bleeding. Further evaluation of coagulation status may be necessary. CT head revealed no acute pathology.  - Follow up with outpatient GI for cirrhosis management    PT/INR: 16.2/1.41 (10/29), 20.2/1.77 (10/31)    # Right-sided chest pain - resolved  R-sided pleuritic CP without radiation, no palpitations; no significant cardiac history. Likely secondary to pressure/fluid buildup due to right pleural effusion. Pain significantly improved s/p thoracentesis with pulmonology and drainage of 1.1L of fluids.     EKG in ED (10/28): TWI in Lead III, Qtc-474.   EKG repeat: TWI in Lead III.   Trop 5 to 7.    # Hypokalemia - resolved  Potassium was 3.6 upon presentation to the ED. Per hepatology, replete > 4 in the setting of cirrhosis and Lasix/Spirinolactone use. Required repletion twice however stabilized after patient began eating and hydrating well.  - Follow up outpatient GI    # Anxiety  History of anxiety, takes lorazepam 1m HS PRN.  - Continue home medication    # Elevated d-dimer  D-dimer 1464 in ED. No history of pulmonary embolism or DVT. Not on anticoagulation. CT angio chest negative for PE.     # Elevated lipase  Lipase 94 in ED. History of pancreatitis. No epigastric pain, no pain radiating to the back, no signs on imaging.     New medications: Spironolactone 50mg daily  Labs to be followed outpatient: AST, ALT, Bilirubin, Alk phos, PT, INR  Exam to be followed outpatient: Liver CT or MRI    CXR: Moderate size right pleural effusion. No left pleural effusion noted. No pneumothorax. No acute infiltrates appearing compared to prior exam   10/28/2024.    CT angio chest: No pulmonary embolism. Moderate right pleural effusion with atelectatic changes. Cirrhotic liver with indeterminate hypodense lesions. Trace ascites.   Consider follow-up MRI.    Abdominal US: 1. Bidirectional flow in the main portal vein suggestive of portal hypertension. Hepatofugal flow in the left portal vein. No evidence of   flow in the right portal vein. Thrombosis of the right portal vein cannot be excluded. Consider contrast enhanced CT for further evaluation.  These findings were discussed with Dr. Phipps  10/30/2024 8:43 AM by Dr. Vergara with read back confirmation. 2. Coarse echotexture and nodular contour of the liver, consistent with known cirrhosis. Borderline hepatomegaly.

## 2024-10-29 NOTE — DISCHARGE NOTE PROVIDER - NSDCCPCAREPLAN_GEN_ALL_CORE_FT
PRINCIPAL DISCHARGE DIAGNOSIS  Diagnosis: Pleural effusion  Assessment and Plan of Treatment: We found fluid around your right lung, which was likely related to your liver condition. A lung specialist was consulted, and we continued your diuretic medications (water pills) to help reduce this fluid. Your liver has been affected by alcohol use, which we call cirrhosis which most likely contributed to the fluid in your lungs. We monitored your liver function with blood tests and consulted a liver specialist. You will need to continue with your current medications at home to help manage this condition.        SECONDARY DISCHARGE DIAGNOSES  Diagnosis: At risk for alcohol withdrawal  Assessment and Plan of Treatment: You have a history of alcohol dependence or alcoholism. Alcohol abuse can be dangerous to your liver and you may experience alcohol withdrawal if you stop drinking suddenly. Common symptoms of alcohol withdrawal include shaking/tremors, vomiting, feelings of severe anxiety/agitation, sweating, fast heart rate, and sometimes hallucinations. Please follow up closely with your primary care physicia to be properly monitored and treated. Please seek care immediately or return to the emergency department if you have symptoms such a but not limited to: severe convulsions, difficulty breathing, chest pain, and/or hallucinations.

## 2024-10-29 NOTE — PROGRESS NOTE ADULT - PROBLEM SELECTOR PLAN 1
- Last drink yesterday (1glasss of wine), no pmhx of seizure, intubation, withdrawal, bld alcohol level 472  - CIWA protocol  - consider libirum 50mg PO q8h   - c/w ativan 2mg PRN for CIWA > 8  - c/w thiamine 500mg q8h  - c/w folate 1mg PO daily  - c/w MVI daily  - fall precautions  - SBIRT  - UTox  - Serum drug levels PMH: drinks 1-2 glasses of wine every other day, last drink Sunday evening (1 glass of wine) no history of seizure, intubation, or withdrawal  Plan: CIWA 3 today, continue to monitor  BECKY 472 in ED    Plan:  - Continue CIWA protocol  - c/w ativan 2mg PRN for CIWA > 8  - c/w thiamine 500mg q8h  - c/w folate 1mg PO daily  - c/w MVI daily  - fall precautions  - SBIRT  - UTox  - Serum drug levels PMH: Alcoholic cirrhosis (last U/S 6 months ago), on Lasix 20mg/Spirinolactone 25mg at home reports noncompliance, follows with [facility] [place] ([phone]). No history of varices.   Presentation: No asterixis, encephalopathy, bleeding; AG 7, Alb 2.4, TBil 2.5, Alk Phos 224, , ALT 23  CXR: Small to moderate right pleural effusion with right basilar opacification compatible with atelectasis or pneumonia. The left lung is clear. The heart is normal in size.  CT angio chest: No pulmonary embolism. Moderate right pleural effusion with atelectatic changes. Cirrhotic liver with indeterminate hypodense lesions. Trace ascites. Consider follow-up MRI.  MELD 18    Plan:  - Daily MELD labs (CMP, Coags)  - Hepatology consult, rec hepatitis panel, , AFP level, ECHO, liver MRI, daily Is and Os, lactulose titrated to 1 BM per day  - Abdomen US with doppler  - Continue home spironolactone 25mg/lasix 20mg  - Nutrition Consult, concern for refeeding syndrome if lytes continue to drop despite repletion           - Should consider to d/c oral nutrition supplements and add Consistent Carbohydrate to diet order  - High protein diet  - Low Na diet for ascites PMH: Alcoholic cirrhosis (last U/S 6 months ago), on Lasix 20mg/Spirinolactone 25mg at home reports noncompliance, follows with [facility] [place] ([phone]). No history of varices.   Presentation: No asterixis, encephalopathy, bleeding; AG 7, Alb 2.4, TBil 2.5, Alk Phos 224, , ALT 23  CXR: Small to moderate right pleural effusion with right basilar opacification compatible with atelectasis or pneumonia. The left lung is clear. The heart is normal in size.  CT angio chest: No pulmonary embolism. Moderate right pleural effusion with atelectatic changes. Cirrhotic liver with indeterminate hypodense lesions. Trace ascites. Consider follow-up MRI.  MELD 18    Plan:  - Daily MELD labs (CMP, Coags)  - Hepatology consult, rec hepatitis panel, AFP level, ECHO, liver MRI, daily Is and Os, lactulose titrated to 1 BM per day  - Abdomen US with doppler  - Continue home spironolactone 25mg/lasix 20mg  - Nutrition Consult, concern for refeeding syndrome if lytes continue to drop despite repletion           - Should consider to d/c oral nutrition supplements and add Consistent Carbohydrate to diet order  - High protein diet  - Low Na diet for ascites PMH: Alcoholic cirrhosis (last U/S 6 months ago), on Lasix 20mg/Spirinolactone 25mg at home reports noncompliance, follows with Dr. Bonita Esquivel [facility] [place] ([phone] 2 months ago and no history of varices. Most recent BM yesterday.   Presentation: No asterixis, encephalopathy, bleeding; AG 7, Alb 2.4, TBil 2.5, Alk Phos 224, , ALT 23  CXR: Small to moderate right pleural effusion with right basilar opacification compatible with atelectasis or pneumonia. The left lung is clear. The heart is normal in size.  CT angio chest: No pulmonary embolism. Moderate right pleural effusion with atelectatic changes. Cirrhotic liver with indeterminate hypodense lesions. Trace ascites. Consider follow-up MRI.  MELD 18    Plan:  - Daily MELD labs (CMP, Coags)  - Hepatology consult, rec hepatitis panel, AFP level, ECHO, liver MRI, daily Is and Os, lactulose titrated to 1 BM per day  - Abdomen US with doppler  - Continue home spironolactone 25mg/lasix 20mg  - Nutrition Consult, concern for refeeding syndrome if lytes continue to drop despite repletion           - Should consider to d/c oral nutrition supplements and add Consistent Carbohydrate to diet order  - High protein diet  - Low Na diet for ascites PMH: Alcoholic cirrhosis (last U/S 6 months ago), on Lasix 20mg/Spirinolactone 25mg at home reports noncompliance, follows with Dr. Bonita Esquivel [facility] [place] ([phone] 2 months ago and no history of varices. Most recent BM yesterday.   Presentation: No asterixis, encephalopathy, bleeding; AG 7, Alb 2.4, TBil 2.5, Alk Phos 224, , ALT 23  CXR: Small to moderate right pleural effusion with right basilar opacification compatible with atelectasis or pneumonia. The left lung is clear. The heart is normal in size.  CT angio chest: No pulmonary embolism. Moderate right pleural effusion with atelectatic changes. Cirrhotic liver with indeterminate hypodense lesions. Trace ascites. Consider follow-up MRI.  MELD 18    Plan:  - Daily MELD labs (CMP, Coags)  - Hepatology consult, rec hepatitis panel, AFP level, ECHO, liver MRI, daily weights, daily, urine sodium, Is and Os, lactulose titrated to 1 BM per day  - Abdomen US with doppler  - CT head  - Continue home spironolactone 25mg/lasix 20mg  - Nutrition Consult, concern for refeeding syndrome if lytes continue to drop despite repletion           - Should consider to d/c oral nutrition supplements and add Consistent Carbohydrate to diet order  - High protein diet  - Low Na diet for ascites PMH: Alcoholic cirrhosis (last U/S 6 months ago), on Lasix 20mg/Spirinolactone 25mg at home reports noncompliance, follows with Dr. Bonita Esquivel [facility] [place] ([phone] 2 months ago and no history of varices. Most recent BM yesterday.   Presentation: No asterixis, encephalopathy, bleeding; AG 7, Alb 2.4, TBil 2.5, Alk Phos 224, , ALT 23  CXR: Small to moderate right pleural effusion with right basilar opacification compatible with atelectasis or pneumonia. The left lung is clear. The heart is normal in size.  CT angio chest: No pulmonary embolism. Moderate right pleural effusion with atelectatic changes. Cirrhotic liver with indeterminate hypodense lesions. Trace ascites.  MELD 18    Plan:  - Daily MELD labs (CMP, Coags)  - Hepatology consult, rec hepatitis panel, AFP level, ECHO, liver MRI, daily weights, daily, urine sodium, Is and Os, lactulose titrated to 1 BM per day  - Abdomen US with doppler  - CT head  - Continue home spironolactone 25mg/lasix 20mg  - Nutrition Consult, concern for refeeding syndrome if lytes continue to drop despite repletion           - Should consider to d/c oral nutrition supplements and add Consistent Carbohydrate to diet order  - High protein diet  - Low Na diet for ascites PMH: Alcoholic cirrhosis (last U/S 6 months ago), on Lasix 20mg/Spirinolactone 25mg at home reports noncompliance, follows with Dr. Bonita Esquivel [facility] [place] ([phone] 2 months ago and no history of varices. Most recent BM yesterday.   Presentation: No asterixis, encephalopathy, bleeding; AG 7, Alb 2.4, TBil 2.5, Alk Phos 224, , ALT 23  CXR: Small to moderate right pleural effusion with right basilar opacification compatible with atelectasis or pneumonia. The left lung is clear. The heart is normal in size.  CT angio chest: No pulmonary embolism. Moderate right pleural effusion with atelectatic changes. Cirrhotic liver with indeterminate hypodense lesions. Trace ascites.  MELD 18    Plan:  - Daily MELD labs (CMP, Coags)  - Hepatology consult, rec hepatitis panel, PETH, AFP level, TTE, liver MRI, daily weights, urine sodium, daily Is and Os, lactulose titrated to 1 BM per day  - Replete K > 4 and Mg >2  - Abdomen US with doppler  - CT head  - Continue home Lasix 20mg  - Increase spironolactone to 50mg daily  - Per hepatology, can use Tylenol up to 2g/day but avoid NSAIDs and limit opioids  - Nutrition Consult, concern for refeeding syndrome if lytes continue to drop despite repletion           - Should consider to d/c oral nutrition supplements and add Consistent Carbohydrate to diet order  - High protein diet  - Low Na diet for ascites

## 2024-10-29 NOTE — PROGRESS NOTE ADULT - SUBJECTIVE AND OBJECTIVE BOX
*****INCOMPLETE NOTE*****    INTERVAL HPI/OVERNIGHT EVENTS:    SUBJECTIVE: Patient seen and examined at bedside, resting comfortably in bed, and does not appear to be in any acute distress. Patient reports    Vital Signs Last 24 Hrs  T(C): 36.8 (29 Oct 2024 05:22), Max: 36.9 (28 Oct 2024 13:05)  T(F): 98.2 (29 Oct 2024 05:22), Max: 98.4 (28 Oct 2024 13:05)  HR: 90 (29 Oct 2024 05:22) (76 - 96)  BP: 122/70 (29 Oct 2024 05:22) (90/55 - 122/70)  BP(mean): --  RR: 18 (29 Oct 2024 05:22) (16 - 18)  SpO2: 93% (29 Oct 2024 05:22) (93% - 97%)    Parameters below as of 28 Oct 2024 21:00  Patient On (Oxygen Delivery Method): nasal cannula  O2 Flow (L/min): 2      PHYSICAL EXAM:  General: in no acute distress  Eyes: EOMI intact bilaterally. Anicteric sclerae, moist conjunctivae  HENT: Moist mucous membranes  Neck: Trachea midline, supple  Lungs: CTA B/L. No wheezes, rales, or rhonchi  Cardiovascular: RRR. No murmurs, rubs, or gallops  Abdomen: Soft, non-tender non-distended; No rebound or guarding  Extremities: WWP, No clubbing, cyanosis or edema  Neurological: Alert and oriented  Skin: Warm and dry. No obvious rash     LABS:                        10.9   4.44  )-----------( 167      ( 29 Oct 2024 05:30 )             34.1     10-28    141  |  106  |  4[L]  ----------------------------<  88  3.6   |  28  |  0.58    Ca    7.9[L]      28 Oct 2024 04:06  Mg     1.6     10-28    TPro  7.6  /  Alb  2.4[L]  /  TBili  2.5[H]  /  DBili  x   /  AST  153[H]  /  ALT  23  /  AlkPhos  224[H]  10-28   INTERVAL HPI/OVERNIGHT EVENTS:     SUBJECTIVE: Patient seen and examined at bedside, resting comfortably in bed, and does not appear to be in any acute distress. Patient reports    Vital Signs Last 24 Hrs  T(C): 36.8 (29 Oct 2024 05:22), Max: 36.9 (28 Oct 2024 13:05)  T(F): 98.2 (29 Oct 2024 05:22), Max: 98.4 (28 Oct 2024 13:05)  HR: 90 (29 Oct 2024 05:22) (76 - 96)  BP: 122/70 (29 Oct 2024 05:22) (90/55 - 122/70)  BP(mean): --  RR: 18 (29 Oct 2024 05:22) (16 - 18)  SpO2: 93% (29 Oct 2024 05:22) (93% - 97%)    Parameters below as of 28 Oct 2024 21:00  Patient On (Oxygen Delivery Method): nasal cannula  O2 Flow (L/min): 2      PHYSICAL EXAM:  General: in no acute distress  Eyes: EOMI intact bilaterally. Anicteric sclerae, moist conjunctivae  HENT: Moist mucous membranes  Neck: Trachea midline, supple  Lungs: CTA B/L. No wheezes, rales, or rhonchi  Cardiovascular: RRR. No murmurs, rubs, or gallops  Abdomen: Soft, non-tender non-distended; No rebound or guarding  Extremities: WWP, No clubbing, cyanosis or edema  Neurological: Alert and oriented  Skin: Warm and dry. No obvious rash     LABS:                        10.9   4.44  )-----------( 167      ( 29 Oct 2024 05:30 )             34.1     10-28    141  |  106  |  4[L]  ----------------------------<  88  3.6   |  28  |  0.58    Ca    7.9[L]      28 Oct 2024 04:06  Mg     1.6     10-28    TPro  7.6  /  Alb  2.4[L]  /  TBili  2.5[H]  /  DBili  x   /  AST  153[H]  /  ALT  23  /  AlkPhos  224[H]  10-28   INTERVAL HPI/OVERNIGHT EVENTS: Patient seen and examined at bedside, resting comfortably in bed, and does not appear to be in any acute distress. Patient reports ongoing right-sided pleuritic chest pain, SOB, and bilateral lower feet pruritis. She is currently on 2L NC however able to breath well without. Denies palpitations, dyspnea, fevers, night sweats, chills, n/v/d/c, dysuria, and urinary frequency.     Vital Signs Last 24 Hrs  T(C): 36.8 (29 Oct 2024 05:22), Max: 36.9 (28 Oct 2024 13:05)  T(F): 98.2 (29 Oct 2024 05:22), Max: 98.4 (28 Oct 2024 13:05)  HR: 90 (29 Oct 2024 05:22) (76 - 96)  BP: 122/70 (29 Oct 2024 05:22) (90/55 - 122/70)  BP(mean): --  RR: 18 (29 Oct 2024 05:22) (16 - 18)  SpO2: 93% (29 Oct 2024 05:22) (93% - 97%)    Parameters below as of 28 Oct 2024 21:00  Patient On (Oxygen Delivery Method): nasal cannula  O2 Flow (L/min): 2      PHYSICAL EXAM:  General: bruising present over entire body, notably left periorbital and frontal ecchymosis, variable sized circular bruises on bilateral upper extremities, bilateral anterior shins, and posterior thighs  Eyes: pupils equal and reactive to light, EOMI intact bilaterally. Anicteric sclerae, moist conjunctivae  HENT: Moist mucous membranes  Neck: Trachea midline, supple  Lungs: CTA B/L. No wheezes, rales, or rhonchi  Cardiovascular: RRR. No murmurs, rubs, or gallops  Abdomen: moderately distended abdomen; R>L flank dulness to percussion; shifting dullness present, absence of fluid wave; liver edge palpated below right costal margin; No rebound or guarding  Extremities: WWP, No clubbing, cyanosis or edema  Neurological: Alert and oriented, when asked location says "Deepak"  Skin: Warm and dry. bruises as noted above    LABS:                          10.9   4.44  )-----------( 167      ( 29 Oct 2024 05:30 )             34.1     10-29    136  |  100  |  4[L]  ----------------------------<  86  3.4[L]   |  25  |  0.37[L]    Ca    8.1[L]      29 Oct 2024 05:30  Phos  2.0     10-29  Mg     2.2     10-29    TPro  7.7  /  Alb  3.0[L]  /  TBili  3.6[H]  /  DBili  x   /  AST  136[H]  /  ALT  18  /  AlkPhos  245[H]  10-29       INTERVAL HPI/OVERNIGHT EVENTS: Patient seen and examined at bedside, resting comfortably in bed, and does not appear to be in any acute distress. Patient reports ongoing right-sided pleuritic chest pain, SOB, and bilateral lower feet pruritis. She is currently on 2L NC however able to breath well without. Denies palpitations, dyspnea, fevers, night sweats, chills, n/v/d/c, dysuria, and urinary frequency.     Vital Signs Last 24 Hrs  T(C): 36.8 (29 Oct 2024 05:22), Max: 36.9 (28 Oct 2024 13:05)  T(F): 98.2 (29 Oct 2024 05:22), Max: 98.4 (28 Oct 2024 13:05)  HR: 90 (29 Oct 2024 05:22) (76 - 96)  BP: 122/70 (29 Oct 2024 05:22) (90/55 - 122/70)  BP(mean): --  RR: 18 (29 Oct 2024 05:22) (16 - 18)  SpO2: 93% (29 Oct 2024 05:22) (93% - 97%)    Parameters below as of 28 Oct 2024 21:00  Patient On (Oxygen Delivery Method): nasal cannula  O2 Flow (L/min): 2      PHYSICAL EXAM:  General: bruising present over entire body, notably left periorbital and frontal ecchymosis, variable sized circular bruises on bilateral upper extremities, bilateral anterior shins, and posterior thighs  Eyes: pupils equal and reactive to light, EOMI intact bilaterally. Anicteric sclerae, moist conjunctivae  HENT: Moist mucous membranes  Neck: Trachea midline, supple  Lungs: CTA B/L. No wheezes, rales, or rhonchi  Cardiovascular: RRR. No murmurs, rubs, or gallops  Abdomen: moderately distended abdomen; R>L flank dulness to percussion; shifting dullness present, absence of fluid wave; liver edge palpated below right costal margin; No rebound or guarding  Extremities: WWP, No clubbing, cyanosis or edema  Neurological: Alert and oriented  Skin: Warm and dry. bruises as noted above    LABS:                          10.9   4.44  )-----------( 167      ( 29 Oct 2024 05:30 )             34.1     10-29    136  |  100  |  4[L]  ----------------------------<  86  3.4[L]   |  25  |  0.37[L]    Ca    8.1[L]      29 Oct 2024 05:30  Phos  2.0     10-29  Mg     2.2     10-29    TPro  7.7  /  Alb  3.0[L]  /  TBili  3.6[H]  /  DBili  x   /  AST  136[H]  /  ALT  18  /  AlkPhos  245[H]  10-29

## 2024-10-29 NOTE — CONSULT NOTE ADULT - ATTENDING SUPERVISION STATEMENT
Chief Complaint   Patient presents with    Post-Op Check     3rd PO Right BKA 1/30/24         SUBJECTIVE:  Patient presents today for his 3rd post op visit following R BKA.  Pt reports that pain is none. Reports some phantom sensation that is unchanged overall  Pt in wheelchair. Denies any falls. Has been performing local wound care as recommended. Did finish his bactrim prescribed at his last visit.     Past Surgical History:   Procedure Laterality Date    ANKLE SURGERY Right 2017    debridement of right ankle tedon    CARDIAC PROCEDURE N/A 11/12/2023    Left heart cath / coronary angiography performed by Shawn Velásquez MD at [facility] CARDIAC CATH LAB    COLONOSCOPY N/A 8/30/2023    COLORECTAL CANCER SCREENING, NOT HIGH RISK performed by Yg Pimentel MD at [facility] ENDOSCOPY    DIALYSIS CATHETER INSERTION N/A 05/05/2023    CATHETER INSERTION PERITONEAL DIALYSIS LAPAROSCOPIC performed by Yg Pimentel MD at [facility] OR    ENDOSCOPY, COLON, DIAGNOSTIC      IR NONTUNNELED VASCULAR CATHETER  05/31/2023    IR NONTUNNELED VASCULAR CATHETER 5/31/2023 [facility] SPECIAL PROCEDURES    LAPAROSCOPY N/A 06/02/2023    LAPAROSCOPY EXPLORATORY PD CATH EXCHANGE performed by Yg Pimentel MD at [facility] OR    LEG AMPUTATION BELOW KNEE Right 1/30/2024    LEG AMPUTATION BELOW KNEE performed by Yg Pimentel MD at [facility] OR    LUMBAR SPINE SURGERY N/A 06/10/2021    LUMBAR LAMINECTOMY DECOMPRESSION POSTERIOR L5-S1 MICRODISCECTOMY, MINIMALLY INVASIVE performed by Glory Skaggs MD at [facility] OR    OTHER SURGICAL HISTORY Left 05/27/2014    Left great toe debridement and closure    MT SCROTAL EXPLORATION Right 02/27/2020    SCROTAL EXPLORATION AND DEBRIDEMENT performed by Charles Price MD at [facility] OR    TOE AMPUTATION Left 02/26/2014    left great    TONSILLECTOMY  8 or 9 years old    UPPER GASTROINTESTINAL ENDOSCOPY N/A 3/7/2024    ESOPHAGOGASTRODUODENOSCOPY BIOPSY performed by Heidy Chin MD at [facility] ENDOSCOPY     Past Medical History:   Diagnosis Date    
Fellow

## 2024-10-29 NOTE — PHYSICAL THERAPY INITIAL EVALUATION ADULT - ADDITIONAL COMMENTS
Pt reports living in apartment, without NOLAN, alone. Reports being independent with daily activities without use of DME. Reports no history of falls, but "bumps into things" often and reports history of impaired balance.

## 2024-10-29 NOTE — PROGRESS NOTE ADULT - ATTENDING COMMENTS
ptn presenting w pleuritic chest pain, EKG stable no contiguous ST-T changes and trop negative, moderate R pleural effusion on CT likely iso cirrhosis, ptn is sp para and thora 3 years ago.  On lung exam no crackles or rhonchi, no WOB or SOB, on RA.  Will consult pulm to eval if may benefit fr para.  Minimal ascites on imaging and on exam, abd soft, non-tender, not tense, unlikely tap-able pocket.  Will trial ptn on prescribed home diuresis lucinda/lasix 25/20 although ptn not compliant w daily doses at home.  May need increased diuresis/IV diuresis if no thora.  Also consulting hepatology for ptn known cirrhosis, low cf encephalopathy or infx donald.  Ptn w significant bruising on exam, states has hit furniture, however, describes relationship status as complicated, may benefit from outptn resources to explore more if ptn amenable.  Ptn currently not appear amenable to questioning.  Prior ED visit for head trauma imaging negative. ptn presenting w pleuritic chest pain, EKG stable no contiguous ST-T changes and trop negative, moderate R pleural effusion on CT likely iso cirrhosis, ptn is sp para and thora 3 years ago.  On lung exam no crackles or rhonchi, no WOB or SOB, on RA.  Will consult pulm to eval if may benefit fr para.  Minimal ascites on imaging and on exam, abd soft, non-tender, not tense, unlikely tap-able pocket.  Will trial ptn on prescribed home diuresis lucinda/lasix 25/20 although ptn not compliant w daily doses at home.  May need increased diuresis/IV diuresis if no thora.  Also consulting hepatology for ptn known cirrhosis, low cf encephalopathy or infx donald.  Ptn w significant bruising on exam, has ongoing alcohol use (CIWA monitoring here, thus far scoring <4), states has hit furniture, however, describes relationship status as complicated, may benefit from outptn resources to explore more if ptn amenable.  Ptn currently not appear amenable to questioning.  Prior ED visit for head trauma imaging negative. ptn presenting w pleuritic chest pain, EKG stable no contiguous ST-T changes and trop negative, moderate R pleural effusion on CT likely iso cirrhosis, ptn is sp para and thora 3 years ago.  On lung exam no crackles or rhonchi, no WOB or SOB, on RA.  Will consult pulm to eval if may benefit fr para.  Minimal ascites on imaging and on exam, abd soft, non-tender, not tense, unlikely tap-able pocket.  Will trial ptn on prescribed home diuresis lucinda/lasix 25/20 although ptn not compliant w daily doses at home.  May need increased diuresis/IV diuresis if no thora.  Also consulting hepatology for ptn known cirrhosis, low cf encephalopathy or infx donald.  Ptn w significant bruising on exam, has ongoing alcohol use (CIWA monitoring here, thus far scoring <4), states has hit furniture, however, describes relationship status as complicated, may benefit from outptn resources to explore more if ptn amenable.  Ptn currently not appear amenable to questioning.  Prior ED visit for head trauma imaging negative..

## 2024-10-29 NOTE — PROGRESS NOTE ADULT - PROBLEM SELECTOR PLAN 2
c/o chest pain rt side, Trop- 5 (WNL), EKG- TWI III, [id number]  - ctm  - Monitor ekg and trops PMH: R-sided pleuritic CP without radiation, no palpitations; no significant cardiac history  Plan:  EKG in ED (10/28): TWI in Lead III, [id number]  EKG repeat: TWI in Lead III  Trop 5 to 7    Plan:  - Continue to monitor EKGs PMH: bilateral pleural effusions during a hospitalization 3 years ago requiring IV diuretics and thoracentesis  Presentation: reported increased SOB for the past 2 days, requiring 2L NC O2.   Etiology: could be secondary to cirrhosis however suspicion is lower given only trace ascites present, will order ECHO to rule out cardiovascular causes  CXR: Small to moderate right pleural effusion with right basilar opacification compatible with atelectasis or pneumonia. The left lung is clear. The heart is normal in size.  CT angio chest: No pulmonary embolism. Moderate right pleural effusion with atelectatic changes. Cirrhotic liver with indeterminate hypodense lesions. Trace ascites. Consider follow-up MRI.    Plan:  - Continue home spironolactone 25mg/lasix 20mg  - Pulmonology consult for diagnostic thoracentesis  - PT eval PMH: bilateral pleural effusions during a hospitalization 3 years ago requiring IV diuretics and thoracentesis  Presentation: reported increased SOB for the past 2 days, requiring 2L NC O2.   Etiology: could be secondary to cirrhosis however suspicion is lower given only trace ascites present, will order ECHO to rule out cardiovascular causes  CXR: Small to moderate right pleural effusion with right basilar opacification compatible with atelectasis or pneumonia. The left lung is clear. The heart is normal in size.  CT angio chest: No pulmonary embolism. Moderate right pleural effusion with atelectatic changes. Cirrhotic liver with indeterminate hypodense lesions. Trace ascites. Consider follow-up MRI.    Plan:  - Continue home Lasix 20mg  - Pulmonology consult for diagnostic thoracentesis  - PT eval

## 2024-10-29 NOTE — DIETITIAN INITIAL EVALUATION ADULT - PERSON TAUGHT/METHOD
D/w pt currently ordered diet: low sodium / fluid restriction. D/w pt protein foods and need for adequate intake. Paged team declined written materials when offered./teach back - (Patient repeats in own words)

## 2024-10-29 NOTE — PROGRESS NOTE ADULT - ASSESSMENT
44F PMHx of chronic bronchitis( since childhood), alcohol use disorder, alcoholic cirrhosis, p/w pleuritic chest pain, found to have elevated blood alcohol level, admitted for CIWA monitoring). 44F PMHx of chronic bronchitis( since childhood), alcohol use disorder, alcoholic cirrhosis, p/w pleuritic chest pain, found to have right-sided pleural effusion and elevated blood alcohol level, admitted for diagnostic thoracentesis and CIWA monitoring.

## 2024-10-29 NOTE — DIETITIAN INITIAL EVALUATION ADULT - OTHER CALCULATIONS
5'9''  pounds +-10%; %IBW=89  Current body wt used for energy calculations as pt falls within % IBW  Adjusted for age and current medical conditions - Fluids per team

## 2024-10-29 NOTE — DIETITIAN INITIAL EVALUATION ADULT - OTHER INFO
44F PMHx of chronic bronchitis, alcohol use disorder, alcoholic cirrhosis, p/w pleuritic chest pain, found to have elevated blood alcohol level, admitted for CIWA monitoring).    Pt consulted to be seen. Visited this morning. Seen alert in bed. CIWA 0; ordered for MVI, thiamine and Folic Acid. K 3.4, Phos 2.0, Na/Mg WNL. Pt Denies nausea, vomiting, diarrhea, or constipation. Breakfast meal pending - pt reported hunger. Endorses good appetite PTA. Lives alone and therefore often buys pre made meals from  [employer] - like soup, mendoza, apples, salads,  peppers, yogurt. No food allergies. No issues chewing. No issues swallowing. Denies weight changes. No pain.   Skin: no edema/pressure ulcers noted at this time, Alfred Osman.   Please see below for nutritions recommendations.

## 2024-10-29 NOTE — DIETITIAN INITIAL EVALUATION ADULT - DIET TYPE
Should lytes continue to drop with supplementation and there is concern for refeeding syndrome: Consider to d/c oral nutrition supplements and add Consistent Carbohydrate to diet order.

## 2024-10-29 NOTE — DIETITIAN INITIAL EVALUATION ADULT - PROBLEM SELECTOR PLAN 1
- Last drink yesterday (1glasss of wine), no pmhx of seizure, intubation, withdrawal, bld alcohol level 472  - CIWA protocol  - consider libirum 50mg PO q8h   - c/w ativan 2mg PRN for CIWA > 8  - c/w thiamine 500mg q8h  - c/w folate 1mg PO daily  - c/w MVI daily  - fall precautions  - SBIRT  - UTox  - Serum drug levels

## 2024-10-29 NOTE — PROGRESS NOTE ADULT - PROBLEM SELECTOR PLAN 5
lipase-94, not 3xULN,  no abdominal pain  - Doesn't meet criteria for Pancreatitis PMH: no history of pulmonary embolism or DVT. Not on anticoagulation  Etiology: likely secondary to alcoholic cirrhosis and in the presence of moderate right pleural effusion  [id number]  CTA chest: negative for PE    Plan:  - Monitor

## 2024-10-29 NOTE — DIETITIAN INITIAL EVALUATION ADULT - PERTINENT MEDS FT
MEDICATIONS  (STANDING):  enoxaparin Injectable 40 milliGRAM(s) SubCutaneous every 24 hours  folic acid 1 milliGRAM(s) Oral daily  furosemide    Tablet 20 milliGRAM(s) Oral every 24 hours  influenza   Vaccine 0.5 milliLiter(s) IntraMuscular once  multivitamin 1 Tablet(s) Oral daily  spironolactone 25 milliGRAM(s) Oral every 24 hours  thiamine IVPB 500 milliGRAM(s) IV Intermittent every 8 hours    MEDICATIONS  (PRN):  LORazepam     Tablet 1 milliGRAM(s) Oral at bedtime PRN for anxiety

## 2024-10-29 NOTE — PROGRESS NOTE ADULT - PROBLEM SELECTOR PLAN 8
F: tolerating PO, no IVF  E: replete K<4, Mg<2  N: Regular    VTE Prophylaxis: Lovenox 40mg q24h  D: RMF PMH: takes lorazepam 1mg HS PRN for anxiety  Presentation: denies anxiety    Plan  - continue home lorazepam PRN

## 2024-10-29 NOTE — CONSULT NOTE ADULT - SUBJECTIVE AND OBJECTIVE BOX
*** DRAFT NOTE ***    HPI:  HPI: 44-year-old female with history of chronic bronchitis since childhood (on inhalers, rarely takes), anemia,  EtOH abuse (no h/o seizure, withdrawal, intubation)and cirrhosis presents with pleuritic chest pain for 1 day.  Patient states that she has had paracentesis and thoracentesis once before approximately 3 years ago, currently being managed with 'water pills' and she is concerned she has fluid in her lungs again.  Patient reports persistent daily drinking, states she only drinks wine and beer and only had 1 glass of wine yesterday evening.  No cough or hemoptysis.  Pt endorses bruising easily, recently moved apartment and bruised her arms and L eye by bumping in bathroom door. denies headache, palpitation, abdominal pain, cough, n/v/d.        In the ED,  VS: T  97.4 , HR 80  , /75   , RR 18   , SpO2   97  % (2L O2 NC)  Labs: Hgb 10.3, MCV-112, [id number], AG-7, Alb-2.4, TBil-2.5, Alk Phos-224, AST-153, ALT-23, Lipase-94, blood alcohol-472  EKG: NSR, TWI III  CXR: R pleural effusion with right basilar   opacification compatible with atelectasis or pneumonia.  Imaging: CTPE neg for PE, Moderate right pleural effusion, Cirrhotic liver with indeterminate hypodense lesions. Trace ascites.    CT Head non con: neg for acute pathology  CT Maxillofacial non con: neg for acute pathology  Orders: Librium 50mg, folic acid 1g, lasix 40, toradol 15mg, thiamine, NS 1L   (28 Oct 2024 18:29)    Allergies    penicillins (Rash; Urticaria)    Intolerances      Home Medications:  furosemide 20 mg oral tablet: 1 tab(s) orally once a day (28 Oct 2024 19:48)  LORazepam 1 mg oral tablet: 1 tab(s) orally once a day (at bedtime) as needed for  anxiety (28 Oct 2024 20:23)  spironolactone 25 mg oral tablet: 1 tab(s) orally once a day (28 Oct 2024 19:48)    MEDICATIONS:  MEDICATIONS  (STANDING):  enoxaparin Injectable 40 milliGRAM(s) SubCutaneous every 24 hours  folic acid 1 milliGRAM(s) Oral daily  furosemide    Tablet 20 milliGRAM(s) Oral every 24 hours  influenza   Vaccine 0.5 milliLiter(s) IntraMuscular once  multivitamin 1 Tablet(s) Oral daily  spironolactone 25 milliGRAM(s) Oral every 24 hours  thiamine IVPB 500 milliGRAM(s) IV Intermittent every 8 hours    MEDICATIONS  (PRN):  LORazepam     Tablet 1 milliGRAM(s) Oral at bedtime PRN for anxiety    PAST MEDICAL & SURGICAL HISTORY:  Cirrhosis      ETOHism      Anxiety      Chronic bronchitis      Tobacco dependence      No significant past surgical history          Vital Signs Last 24 Hrs  T(C): 36.6 (29 Oct 2024 09:38), Max: 36.9 (28 Oct 2024 13:05)  T(F): 97.8 (29 Oct 2024 09:38), Max: 98.4 (28 Oct 2024 13:05)  HR: 87 (29 Oct 2024 09:38) (77 - 96)  BP: 134/75 (29 Oct 2024 09:38) (90/55 - 134/75)  BP(mean): --  RR: 18 (29 Oct 2024 09:38) (16 - 18)  SpO2: 92% (29 Oct 2024 09:38) (92% - 97%)    Parameters below as of 29 Oct 2024 09:38  Patient On (Oxygen Delivery Method): room air          PHYSICAL EXAM:  General: Alert, no acute distress  Lungs: Normal respiratory effort  Abdomen: Soft, nondistended, nontender  Extremities: *** edema  Neurological: Moving all extremities spontaneously***    LABS:                        10.9   4.44  )-----------( 167      ( 29 Oct 2024 05:30 )             34.1     10-29    136  |  100  |  4[L]  ----------------------------<  86  3.4[L]   |  25  |  0.37[L]    Ca    8.1[L]      29 Oct 2024 05:30  Phos  2.0     10-29  Mg     2.2     10-29    TPro  7.7  /  Alb  3.0[L]  /  TBili  3.6[H]  /  DBili  x   /  AST  136[H]  /  ALT  18  /  AlkPhos  245[H]  10-29    PT/INR - ( 29 Oct 2024 05:30 )   PT: 16.2 sec;   INR: 1.41          PTT - ( 29 Oct 2024 05:30 )  PTT:38.5 sec    RADIOLOGY & ADDITIONAL STUDIES:  Reviewed. Outpatient GI/hep: Bonita Esquivel MD    HPI:  44F with EtOH cirrhosis (c/b ascites, ?hydrothorax), active EtOH use, chronic bronchitis since childhood p/w pleuritic chest discomfort x 3-4d, found with R pleural effusion.     Pt reports she had ascites and pleural effusion ~3yrs ago which was ultimately found to be due to alcoholic cirrhosis (exact workup unclear). She has been following with Dr. Esquivel and was previously on furosemide 20mg + spironolactone 25mg outpatient but self-DCed them ~1 yr ago due to resolution of fluid. She reports currently drinking 1-2 drinks of alcohol every other day and endorsed somewhat greater EtOH use in her 30s but estimates that it was around ~10 drinks/week.     She developed pleuritic chest discomfort 3-4 days ago and restarted her diuretics without significant improvement thus presented to [facility] ED, where CTA chest showed a moderate R pleural effusion, cirrhotic liver with indeterminate hypodense lesions, and trace ascites. Alcohol 472, trop neg x2. Received 1L IVF followed by IV furosemide 40mg x1 in ED with improvement in chest discomfort. No melena, BRBPR, hematemesis, abd pain, CP, SOB, or confusion. Reports last EGD 2 months ago w/o varices and last RUQ ultrasound 6 months ago.    FH: Denies FH of liver disease or liver cancer.     Allergies  penicillins (Rash; Urticaria)    Intolerances      Home Medications:  furosemide 20 mg oral tablet: 1 tab(s) orally once a day (28 Oct 2024 19:48)  LORazepam 1 mg oral tablet: 1 tab(s) orally once a day (at bedtime) as needed for  anxiety (28 Oct 2024 20:23)  spironolactone 25 mg oral tablet: 1 tab(s) orally once a day (28 Oct 2024 19:48)    MEDICATIONS:  MEDICATIONS  (STANDING):  enoxaparin Injectable 40 milliGRAM(s) SubCutaneous every 24 hours  folic acid 1 milliGRAM(s) Oral daily  furosemide    Tablet 20 milliGRAM(s) Oral every 24 hours  influenza   Vaccine 0.5 milliLiter(s) IntraMuscular once  multivitamin 1 Tablet(s) Oral daily  spironolactone 25 milliGRAM(s) Oral every 24 hours  thiamine IVPB 500 milliGRAM(s) IV Intermittent every 8 hours    MEDICATIONS  (PRN):  LORazepam     Tablet 1 milliGRAM(s) Oral at bedtime PRN for anxiety    PAST MEDICAL & SURGICAL HISTORY:  Cirrhosis      ETOHism      Anxiety      Chronic bronchitis      Tobacco dependence      No significant past surgical history          Vital Signs Last 24 Hrs  T(C): 36.6 (29 Oct 2024 09:38), Max: 36.9 (28 Oct 2024 13:05)  T(F): 97.8 (29 Oct 2024 09:38), Max: 98.4 (28 Oct 2024 13:05)  HR: 87 (29 Oct 2024 09:38) (77 - 96)  BP: 134/75 (29 Oct 2024 09:38) (90/55 - 134/75)  BP(mean): --  RR: 18 (29 Oct 2024 09:38) (16 - 18)  SpO2: 92% (29 Oct 2024 09:38) (92% - 97%)    Parameters below as of 29 Oct 2024 09:38  Patient On (Oxygen Delivery Method): room air          PHYSICAL EXAM:  General: Alert, no acute distress  Lungs: Normal respiratory effort, on NC  Abdomen: Soft, nondistended, nontender  Extremities: No edema  Neurological: Moving all extremities spontaneously, no asterixis    LABS:                        10.9   4.44  )-----------( 167      ( 29 Oct 2024 05:30 )             34.1     10-29    136  |  100  |  4[L]  ----------------------------<  86  3.4[L]   |  25  |  0.37[L]    Ca    8.1[L]      29 Oct 2024 05:30  Phos  2.0     10-29  Mg     2.2     10-29    TPro  7.7  /  Alb  3.0[L]  /  TBili  3.6[H]  /  DBili  x   /  AST  136[H]  /  ALT  18  /  AlkPhos  245[H]  10-29    PT/INR - ( 29 Oct 2024 05:30 )   PT: 16.2 sec;   INR: 1.41          PTT - ( 29 Oct 2024 05:30 )  PTT:38.5 sec    RADIOLOGY & ADDITIONAL STUDIES:  Reviewed.

## 2024-10-29 NOTE — DISCHARGE NOTE PROVIDER - CARE PROVIDER_API CALL
HAYDER ABDUL SAM  Follow Up Time: 2 weeks   Bonita Esquivel  [address] [place] [address]  Phone: [phone]  Fax: [phone]  Scheduled Appointment: 11/01/2024 11:30 PM   Bonita Esquivel  [address] [place] [address]  Phone: [phone]  Fax: [phone]  Scheduled Appointment: 11/01/2024 11:30 AM

## 2024-10-29 NOTE — PROGRESS NOTE ADULT - PROBLEM SELECTOR PLAN 4
[id number], CTA neg for PE  -Most like elevated iso cirrhosis PMH: R-sided pleuritic CP without radiation, no palpitations; no significant cardiac history  Etiology: likely secondary to pressure/fluid buildup due to right pleural effusion  EKG in ED (10/28): TWI in Lead III, Qtc-474  EKG repeat: TWI in Lead III  Trop 5 to 7    Plan:  - Continue to monitor EKGs PMH: R-sided pleuritic CP without radiation, no palpitations; no significant cardiac history  Etiology: likely secondary to pressure/fluid buildup due to right pleural effusion  EKG in ED (10/28): TWI in Lead III, Qtc-474  EKG repeat: TWI in Lead III  Trop 5 to 7    Plan:  - Continue to monitor PMH: R-sided pleuritic CP without radiation, no palpitations; no significant cardiac history  Etiology: likely secondary to pressure/fluid buildup due to right pleural effusion  EKG in ED (10/28): TWI in Lead III, Qtc-474  EKG repeat: TWI in Lead III  Trop 5 to 7    Plan:  - Continue to monitor  - Per hepatology, for pain can use Tylenol up to 2g/day but avoid NSAIDs and limit opioids

## 2024-10-29 NOTE — PHYSICAL THERAPY INITIAL EVALUATION ADULT - LEVEL OF INDEPENDENCE: SIT/SUPINE, REHAB EVAL
Letter by Xochitl Richey RDCS at      Author: Xochitl Richey RDCS Service: -- Author Type: --    Filed:  Encounter Date: 3/5/2020 Status: (Other)         Destinee Green  [address] [place] [id number]      March 5, 2020      Dear MsCydney Green,    RE: Remote Results    We are writing to you regarding your recent Remote Pacemaker check from home. Your transmission was received successfully. Battery status is satisfactory at this time.     Your results are showing no significant changes.    Your next device appointment will be a remote check on June 9, 2020.  You can choose the time of day you wish to transmit.    To schedule or reschedule, please call [phone] and press 1.    NOTE: If you would like to do an extra transmission, please call [phone] and press 3 to speak to a nurse BEFORE transmitting. This ensures that the Device Clinic staff is aware of the reason you are sending a transmission, and can follow-up with you after it has been reviewed.    We will be checking your implanted device from home (remotely) every three months unless otherwise instructed. We will need to see you in the clinic at least once a year. You may need to be seen in the clinic sooner depending on the results of your check.    Please be aware:    The follow-up schedule is like a Physician prescription.    Your remote monitor is paired to your specific implanted device.      Sincerely,    [facility]       
Take your antibiotics as directed.  Follow-up with your primary doctor in the next 3-4 days if your symptoms are not improving.  As discussed, you can also use sinus flushes.      Return to the ER immediately if you develop any new or significantly worsening symptoms such as severe headache, confusion, fever greater than 100.4° or any other worrisome symptoms.  
supervision

## 2024-10-29 NOTE — PROGRESS NOTE ADULT - PROBLEM SELECTOR PLAN 3
PMHx of alcoholic cirrhosis, on oral diuretics, AG-7, Alb-2.4, TBil-2.5, Alk Phos-224, AST-153, ALT-23, ascites on physical exam w/pleural effusion    Recommendations:  - Daily MELD labs (CMP, Coags)  - Abdomen US with doppler  - Diuretics: Spironolactone 25mg/Lasix 20mg (home meds)  - Nutrition Consult  - High protein diet  - Low Na diet for ascites PMH: drinks 1-2 glasses of wine every other day, last drink Sunday evening (1 glass of wine) no history of seizure, intubation, or withdrawal  Presentation: BECKY 472 in ED, denies tremors    Plan:  - Continue CIWA protocol  - c/w ativan 2mg PRN for CIWA > 8  - c/w thiamine 500mg q8h  - c/w folate 1mg PO daily  - c/w MVI daily  - fall precautions  - SBIRT  - UTox  - Serum drug levels PMH: drinks 1-2 glasses of wine every other day, last drink Sunday evening (1 glass of wine) no history of seizure, intubation, or withdrawal  Presentation: BECKY 472 in ED, denies tremors    Plan:  - Continue CIWA protocol  - Continue ativan 2mg PRN for CIWA > 8  - Continue thiamine 500mg q8h  - Continue folate 1mg PO daily  - Continue MVI daily  - fall precautions  - SBIRT  - UTox  - Serum drug levels PMH: drinks 1-2 glasses of wine every other day, last drink Sunday evening (1 glass of wine) no history of seizure, intubation, or withdrawal  Presentation: BECKY 472 in ED, denies tremors  CIWA 0 today    Plan:  - Continue CIWA protocol  - Continue ativan 2mg PRN for CIWA > 8  - Continue thiamine 500mg q8h  - Continue folate 1mg PO daily  - Continue MVI daily  - fall precautions  - SBIRT  - UTox  - Serum drug levels PMH: drinks 1-2 glasses of wine every other day, last drink Sunday evening (1 glass of wine) no history of seizure, intubation, or withdrawal  Presentation: BECKY 472 in ED, denies tremors  CIWA 0 today    Plan:  - Continue CIWA protocol  - Lorazepam PRN for alcohol withdrawal rather than other benzodiazepines d/t cirrhosis  - Continue thiamine 500mg q8h  - Continue folate 1mg PO daily  - Continue MVI daily  - fall precautions  - SBIRT  - UTox  - Serum drug levels

## 2024-10-29 NOTE — CONSULT NOTE ADULT - ASSESSMENT
44F with EtOH cirrhosis (c/b ascites, ?hydrothorax), active EtOH use, chronic bronchitis since childhood p/w pleuritic chest discomfort x 3-4d, found with R pleural effusion.     Based on pt-reported history findings most likely due to hepatic hydrothorax though would warrant exclusion of other causes.     She reports less alcohol use than what one would expect to cause EtOH cirrhosis, so it is possible that either there is another contributory factor or she is underreporting her alcohol intake (serum alcohol >400 on admission) for which PETH and collateral from her outpatient GI can clarify. Regardless of the etiology we discussed that she needs to abstain from further alcohol use as this could lead to further progressive irreversible liver damage.    We discussed that her indeterminate liver CT findings may represent benign changes but given her increased risk of HCC would obtain MRI liver to further evaluate. We informed her that if this is not completed inpatient it would be very important for this to be done by her outpatient doctors.    Recommendations:  - Send PETH, AFP, hep A IgG, hep B surface Ag, hep B surface Ab, hep B total core Ab, hep C Ab, TTE  - Would favor MRI w/wo contrast to evaluate liver findings on CT, but OK to start with RUQ ultrasound  - Agree w/ pulm evaluation of pleural effusion  - Daily weight, strict I/O, and replete K > 4 and Mg >2  - Increase spironolactone to 50mg daily  - Please use lorazepam PRN for alcohol withdrawal in lieu of other benzodiazepines  - For pain control can give Tylenol up to 2g/day but avoid NSAIDs and limit opioids    We will continue to follow. Please see attending addendum for final recommendations.     Yuniel (Hudson) MD Del[place]  Gastroenterology Fellow  GI consult pager (M-F 8j-3m): [phone]  Please call  for on-call fellow after hours   44F with EtOH cirrhosis (c/b ascites, ?hydrothorax), active EtOH use, chronic bronchitis since childhood p/w pleuritic chest discomfort x 3-4d, found with R pleural effusion.     Based on pt-reported history findings could be due to hepatic hydrothorax though would warrant exclusion of other causes since typically hydrothorax is seen in setting of more significant ascites/portal hypertension.    She reports less alcohol use than what one would expect to cause EtOH cirrhosis, so it is possible that either there is another contributory factor or she is underreporting her alcohol intake (serum alcohol >400 on admission) for which PETH and collateral from her outpatient GI can clarify. Regardless of the etiology we discussed that she needs to abstain from further alcohol use as this could lead to further progressive irreversible liver damage.    We discussed that her indeterminate liver CT findings may represent benign changes but given her increased risk of HCC would obtain MRI liver to further evaluate. We informed her that if this is not completed inpatient it would be very important for this to be done by her outpatient doctors.    Recommendations:  - Send PETH, AFP, hep A IgG, hep B surface Ag, hep B surface Ab, hep B total core Ab, hep C Ab, TTE  - Would favor MRI w/wo contrast to evaluate liver findings on CT, but OK to start with RUQ ultrasound  - Agree w/ pulm evaluation of pleural effusion given atypical for hydrothorax  - Daily weight, strict I/O, and replete K > 4 and Mg >2  - Increase spironolactone to 50mg daily  - Please use lorazepam PRN for alcohol withdrawal in lieu of other benzodiazepines  - For pain control can give Tylenol up to 2g/day but avoid NSAIDs and limit opioids    We will continue to follow. Please see attending addendum for final recommendations.     uYniel (Hudson) MD [place]  Gastroenterology Fellow  GI consult pager (M-F 0c-4c): [phone]  Please call  for on-call fellow after hours

## 2024-10-29 NOTE — PROGRESS NOTE ADULT - PROBLEM SELECTOR PLAN 7
Home meds: Lorazepam 1mg HS PRN for anxiety  - continue home meds PMH: HgB 12.8 in March 2024, patient reports history of long-standing anemia  Etiology: likely secondary to alcohol use/vitamin deficiency  Hgb 10.3,     Plan:  - F/u B12 and folate  - Maintain active type/screen, Transfuse if hgb <7

## 2024-10-29 NOTE — DISCHARGE NOTE PROVIDER - NSDCMRMEDTOKEN_GEN_ALL_CORE_FT
furosemide 20 mg oral tablet: 1 tab(s) orally once a day  LORazepam 1 mg oral tablet: 1 tab(s) orally once a day (at bedtime) as needed for  anxiety  spironolactone 25 mg oral tablet: 1 tab(s) orally once a day   furosemide 20 mg oral tablet: 1 tab(s) orally once a day  Lasix 20 mg oral tablet: 1 tab(s) orally once a day  LORazepam 1 mg oral tablet: 1 tab(s) orally once a day (at bedtime) as needed for  anxiety  spironolactone 25 mg oral tablet: 1 tab(s) orally once a day  spironolactone 50 mg oral tablet: 1 tab(s) orally once a day

## 2024-10-29 NOTE — PHYSICAL THERAPY INITIAL EVALUATION ADULT - GAIT DEVIATIONS NOTED, PT EVAL
wide base of support, increased postural sway, ~1-2 episodes of LOB in which pt self-recovered, pt often reaching for wall/handrail/decreased cindy/decreased step length/decreased weight-shifting ability

## 2024-10-29 NOTE — DIETITIAN INITIAL EVALUATION ADULT - PROBLEM SELECTOR PLAN 3
PMHx of alcoholic cirrhosis, on oral diuretics, AG-7, Alb-2.4, TBil-2.5, Alk Phos-224, AST-153, ALT-23, ascites on physical exam w/pleural effusion    Recommendations:  - Daily MELD labs (CMP, Coags)  - Abdomen US with doppler  - Diuretics: Spironolactone 25mg/Lasix 20mg (home meds)  - Nutrition Consult  - High protein diet  - Low Na diet for ascites Itraconazole Counseling:  I discussed with the patient the risks of itraconazole including but not limited to liver damage, nausea/vomiting, neuropathy, and severe allergy.  The patient understands that this medication is best absorbed when taken with acidic beverages such as non-diet cola or ginger ale.  The patient understands that monitoring is required including baseline LFTs and repeat LFTs at intervals.  The patient understands that they are to contact us or the primary physician if concerning signs are noted.

## 2024-10-29 NOTE — DIETITIAN INITIAL EVALUATION ADULT - PERTINENT LABORATORY DATA
10-29    136  |  100  |  4[L]  ----------------------------<  86  3.4[L]   |  25  |  0.37[L]    Ca    8.1[L]      29 Oct 2024 05:30  Phos  2.0     10-29  Mg     2.2     10-29    TPro  7.7  /  Alb  3.0[L]  /  TBili  3.6[H]  /  DBili  x   /  AST  136[H]  /  ALT  18  /  AlkPhos  245[H]  10-29

## 2024-10-29 NOTE — DISCHARGE NOTE PROVIDER - PROVIDER TOKENS
PROVIDER:[TOKEN:[[id number]],FOLLOWUP:[2 weeks]] FREE:[LAST:[Quainoo],FIRST:[Bonita],PHONE:[phone]:[phone]],ADDRESS:[[address] [place] [address]],SCHEDULEDAPPT:[11/01/2024],SCHEDULEDAPPTTIME:[11:30 PM]] FREE:[LAST:[Quainoo],FIRST:[Bonita],PHONE:[phone]],FAX:[phone]],ADDRESS:[[address] [place] [address]],SCHEDULEDAPPT:[11/01/2024],SCHEDULEDAPPTTIME:[11:30 AM]]

## 2024-10-29 NOTE — PHYSICAL THERAPY INITIAL EVALUATION ADULT - PERTINENT HX OF CURRENT PROBLEM, REHAB EVAL
44F PMHx of chronic bronchitis( since childhood), alcohol use disorder, alcoholic cirrhosis, p/w pleuritic chest pain, found to have right-sided pleural effusion and elevated blood alcohol level, admitted for diagnostic thoracentesis and CIWA monitoring.

## 2024-10-29 NOTE — CONSULT NOTE ADULT - ATTENDING COMMENTS
Patient seen and examined with the fellow.  Agree with the plan outlined as above.  Will proceed with thoracentesis

## 2024-10-29 NOTE — DIETITIAN INITIAL EVALUATION ADULT - ADD RECOMMEND
1. Monitor PO intake/appetite, GI distress, diet tolerance, labs, weights.  2. Honor pt food preferences as able.  3. RD to remain available for additional nutrition interventions as needed.  -- High Nutrition Risk.

## 2024-10-29 NOTE — CONSULT NOTE ADULT - ASSESSMENT
44-year-old female with history of chronic bronchitis since childhood (on inhalers, rarely takes), anemia,  EtOH abuse (no h/o seizure, withdrawal, intubation)and cirrhosis presents with pleuritic chest pain for 1 day.  Patient states that she has had paracentesis and thoracentesis once before approximately 3 years ago, currently being managed with 'water pills' and she is concerned she has fluid in her lungs again.  Patient reports persistent daily drinking, states she only drinks wine and beer and only had 1 glass of wine yesterday evening.  No cough or hemoptysis.  Pt endorses bruising easily, recently moved apartment and bruised her arms and L eye by bumping in bathroom door. denies headache, palpitation, abdominal pain, cough, n/v/d.    Additional pulmonary history:  Patient notes she had a chest tube placed about 2 years ago with [facility], and it was felt that the effusion was related to liver disease. Has not had any repeat procedures since then. Does feel dyspnea but improving with diuretics. She denies fevers, chills, productive cough, weight loss. She does not have a pulmonologist.     45 y/o F with chronic alcohol use and chronic bronchitis presented with pleuritic chest pain for one day. She has a history of cirrhosis and underwent a chest tube placement 2 years ago at [facility], per patient she was told the effusion was related to her cirrhosis. Her CT chest now shows no PE but large R pleural effusion, no ascites. On bedside ultrasound assessment there is a large simple pleural effusion. No ascites appeciated.     Differentials for this effusion includes transudative 2/2 cirrhosis, and there is a possibility of spontaneous bacterial empyema in setting of cirrhosis although ultrasound features are simple. Hepatohydrothorax is less likely without large ascites. Other differentials that are less likely include parapneumonic effusion, and malignant effusion.    Plan:  - diagnostic thoracentesis in am  - pulmonary will follow  - appreciate GI recs  - obtain records from [facility] [place] if possible regarding pleural fluid studies    Patient was seen, evaluated and discussed with PCCM attending.    Brice Shine MD  PCCM Fellow, PGY-5    Please page pulmonary if there are any questions with above recommendations.

## 2024-10-30 LAB
ADD ON TEST-SPECIMEN IN LAB: SIGNIFICANT CHANGE UP
ADD ON TEST-SPECIMEN IN LAB: SIGNIFICANT CHANGE UP
ALBUMIN FLD-MCNC: 1.7 G/DL — SIGNIFICANT CHANGE UP
ALBUMIN SERPL ELPH-MCNC: 2.9 G/DL — LOW (ref 3.3–5)
ALP SERPL-CCNC: 220 U/L — HIGH (ref 40–120)
ALT FLD-CCNC: 14 U/L — SIGNIFICANT CHANGE UP (ref 10–45)
AMYLASE FLD-CCNC: 40 U/L — SIGNIFICANT CHANGE UP
ANION GAP SERPL CALC-SCNC: 9 MMOL/L — SIGNIFICANT CHANGE UP (ref 5–17)
APTT BLD: 39.2 SEC — HIGH (ref 24.5–35.6)
AST SERPL-CCNC: 109 U/L — HIGH (ref 10–40)
B PERT IGG+IGM PNL SER: SIGNIFICANT CHANGE UP
BILIRUB SERPL-MCNC: 4.8 MG/DL — HIGH (ref 0.2–1.2)
BUN SERPL-MCNC: 6 MG/DL — LOW (ref 7–23)
CALCIUM SERPL-MCNC: 8.3 MG/DL — LOW (ref 8.4–10.5)
CHLORIDE SERPL-SCNC: 102 MMOL/L — SIGNIFICANT CHANGE UP (ref 96–108)
CO2 SERPL-SCNC: 23 MMOL/L — SIGNIFICANT CHANGE UP (ref 22–31)
COLOR FLD: YELLOW — SIGNIFICANT CHANGE UP
CREAT SERPL-MCNC: 0.39 MG/DL — LOW (ref 0.5–1.3)
EGFR: 126 ML/MIN/1.73M2 — SIGNIFICANT CHANGE UP
FLUID INTAKE SUBSTANCE CLASS: SIGNIFICANT CHANGE UP
GLUCOSE FLD-MCNC: 104 MG/DL — SIGNIFICANT CHANGE UP
GLUCOSE SERPL-MCNC: 71 MG/DL — SIGNIFICANT CHANGE UP (ref 70–99)
HAV IGM SER-ACNC: SIGNIFICANT CHANGE UP
HBV CORE IGM SER-ACNC: SIGNIFICANT CHANGE UP
HBV SURFACE AG SER-ACNC: SIGNIFICANT CHANGE UP
HCV AB S/CO SERPL IA: 0.07 S/CO — SIGNIFICANT CHANGE UP
HCV AB SERPL-IMP: SIGNIFICANT CHANGE UP
INR BLD: 1.68 — HIGH (ref 0.85–1.16)
LDH SERPL L TO P-CCNC: 152 U/L — SIGNIFICANT CHANGE UP
LDH SERPL L TO P-CCNC: 282 U/L — HIGH (ref 50–242)
LYMPHOCYTES # FLD: 87 % — SIGNIFICANT CHANGE UP
MAGNESIUM SERPL-MCNC: 1.7 MG/DL — SIGNIFICANT CHANGE UP (ref 1.6–2.6)
MELD SCORE WITH DIALYSIS: 32 POINTS — SIGNIFICANT CHANGE UP
MELD SCORE WITHOUT DIALYSIS: 20 POINTS — SIGNIFICANT CHANGE UP
MONOS+MACROS # FLD: 6 % — SIGNIFICANT CHANGE UP
NEUTROPHILS-BODY FLUID: 7 % — SIGNIFICANT CHANGE UP
PH, PLEURAL FLUID: 7.73 — SIGNIFICANT CHANGE UP
PHOSPHATE SERPL-MCNC: 2.5 MG/DL — SIGNIFICANT CHANGE UP (ref 2.5–4.5)
POTASSIUM SERPL-MCNC: 3.7 MMOL/L — SIGNIFICANT CHANGE UP (ref 3.5–5.3)
POTASSIUM SERPL-SCNC: 3.7 MMOL/L — SIGNIFICANT CHANGE UP (ref 3.5–5.3)
PROT FLD-MCNC: 3.7 G/DL — SIGNIFICANT CHANGE UP
PROT SERPL-MCNC: 7.1 G/DL — SIGNIFICANT CHANGE UP (ref 6–8.3)
PROT SERPL-MCNC: 7.1 G/DL — SIGNIFICANT CHANGE UP (ref 6–8.3)
PROTHROM AB SERPL-ACNC: 19.5 SEC — HIGH (ref 9.9–13.4)
RCV VOL RI: < 2000 /UL — SIGNIFICANT CHANGE UP (ref 0–0)
SODIUM SERPL-SCNC: 134 MMOL/L — LOW (ref 135–145)
SPECIMEN SOURCE FLD: SIGNIFICANT CHANGE UP
TOTAL NUCLEATED CELL COUNT, BODY FLUID: 252 /UL — SIGNIFICANT CHANGE UP
TUBE TYPE: SIGNIFICANT CHANGE UP

## 2024-10-30 PROCEDURE — 71045 X-RAY EXAM CHEST 1 VIEW: CPT | Mod: 26

## 2024-10-30 PROCEDURE — 99233 SBSQ HOSP IP/OBS HIGH 50: CPT | Mod: 25

## 2024-10-30 PROCEDURE — 88189 FLOWCYTOMETRY/READ 16 & >: CPT

## 2024-10-30 PROCEDURE — 88112 CYTOPATH CELL ENHANCE TECH: CPT | Mod: 26,59

## 2024-10-30 PROCEDURE — 32555 ASPIRATE PLEURA W/ IMAGING: CPT | Mod: RT

## 2024-10-30 PROCEDURE — 99233 SBSQ HOSP IP/OBS HIGH 50: CPT | Mod: GC

## 2024-10-30 PROCEDURE — 76604 US EXAM CHEST: CPT | Mod: 26

## 2024-10-30 PROCEDURE — 88305 TISSUE EXAM BY PATHOLOGIST: CPT | Mod: 26

## 2024-10-30 RX ORDER — POTASSIUM CHLORIDE 10 MEQ
40 TABLET, EXTENDED RELEASE ORAL ONCE
Refills: 0 | Status: COMPLETED | OUTPATIENT
Start: 2024-10-30 | End: 2024-10-30

## 2024-10-30 RX ADMIN — Medication 40 MILLIGRAM(S): at 21:34

## 2024-10-30 RX ADMIN — Medication 105 MILLIGRAM(S): at 21:38

## 2024-10-30 RX ADMIN — Medication 105 MILLIGRAM(S): at 06:14

## 2024-10-30 RX ADMIN — Medication 50 MILLIGRAM(S): at 21:34

## 2024-10-30 RX ADMIN — Medication 1 TABLET(S): at 11:30

## 2024-10-30 RX ADMIN — Medication 20 MILLIGRAM(S): at 06:14

## 2024-10-30 RX ADMIN — FOLIC ACID 1 MILLIGRAM(S): 1 TABLET ORAL at 11:31

## 2024-10-30 RX ADMIN — Medication 105 MILLIGRAM(S): at 14:39

## 2024-10-30 RX ADMIN — Medication 40 MILLIEQUIVALENT(S): at 11:31

## 2024-10-30 NOTE — PROGRESS NOTE ADULT - PROBLEM SELECTOR PLAN 2
PMH: bilateral pleural effusions during a hospitalization 3 years ago requiring IV diuretics and thoracentesis  Presentation: reported increased SOB for the past 2 days, requiring 2L NC O2.   Etiology: could be secondary to cirrhosis however suspicion is lower given only trace ascites present, will order ECHO to rule out cardiovascular causes  CXR: Small to moderate right pleural effusion with right basilar opacification compatible with atelectasis or pneumonia. The left lung is clear. The heart is normal in size.  CT angio chest: No pulmonary embolism. Moderate right pleural effusion with atelectatic changes. Cirrhotic liver with indeterminate hypodense lesions. Trace ascites. Consider follow-up MRI.    Plan:  - Continue home Lasix 20mg  - Pulmonology consult for diagnostic thoracentesis  - PT eval

## 2024-10-30 NOTE — PROGRESS NOTE ADULT - PROBLEM SELECTOR PLAN 5
PMH: no history of pulmonary embolism or DVT. Not on anticoagulation  Etiology: likely secondary to alcoholic cirrhosis and in the presence of moderate right pleural effusion  [id number]  CTA chest: negative for PE    Plan:  - Monitor

## 2024-10-30 NOTE — PROGRESS NOTE ADULT - ASSESSMENT
44F with EtOH cirrhosis (c/b ascites, ?hydrothorax), active EtOH use, chronic bronchitis since childhood p/w pleuritic chest discomfort x 3-4d, found with R pleural effusion.     Based on pt-reported history findings could be due to hepatic hydrothorax though would warrant exclusion of other causes since typically hydrothorax is seen in setting of more significant ascites/portal hypertension.    She reports less alcohol use than what one would expect to cause EtOH cirrhosis, so it is possible that either there is another contributory factor or she is underreporting her alcohol intake (serum alcohol >400 on admission) for which PETH and collateral from her outpatient GI can clarify. Regardless of the etiology we discussed that she needs to abstain from further alcohol use as this could lead to further progressive irreversible liver damage. She is not interested in medications or programs for alcohol use.    We discussed that her indeterminate liver CT findings may represent benign changes but given her increased risk of HCC would obtain MRI liver to further evaluate. We informed her that if this is not completed inpatient it would be very important for this to be done by her outpatient doctors.    Recommendations:  - F/u PETH and AFP  - CT or MRI with contrast can evaluate for presence of PVT though MRI would also characterize liver lesions. If MRI not performed inpatient please highlight this as a transitional issue  - Agree w/ pulm evaluation of pleural effusion given atypical for hydrothorax  - Daily weight, strict I/O, and replete K > 4 and Mg >2  - Continue furosemide 20mg + spironolactone 50mg daily  - For pain control can give Tylenol up to 2g/day but avoid NSAIDs and limit opioids    We will continue to follow. Please see attending addendum for final recommendations.     Yuniel (Marie Potts MD  Gastroenterology Fellow  GI consult pager (M-F [id number]): [phone]  Please call  for on-call fellow after hours

## 2024-10-30 NOTE — PROGRESS NOTE ADULT - PROBLEM SELECTOR PLAN 9
Presentation: K 3.6 in ED, down to 3.4 after admission  Etiology: could be secondary to refeeding syndrome  S/p potassium phosphate / sodium phosphate Powder one packet 10/29  3.7 this AM    Plan:  - Repeat potassium phosphate / sodium phosphate Powder one packet   - Nutrition following, should consider to d/c oral nutrition supplements and add Consistent Carbohydrate to diet order if refeeding syndrome suspected  - Replete as needed, maintain K > 4 per hepatology  - Continue to monitor Presentation: K 3.6 in ED, down to 3.4 after admission  Etiology: could be secondary to refeeding syndrome  S/p potassium phosphate / sodium phosphate Powder one packet 10/29  3.7 this AM    Plan:  - Repeat potassium phosphate / sodium phosphate Powder one packet   - Nutrition following, should consider to d/c oral nutrition supplements and add Consistent Carbohydrate to diet order if refeeding syndrome suspected  - Replete as needed, maintain K > 4 given going up on diuresis  - Continue to monitor

## 2024-10-30 NOTE — PROGRESS NOTE ADULT - PROBLEM SELECTOR PLAN 1
PMH: Alcoholic cirrhosis (last U/S 6 months ago), on Lasix 20mg/Spirinolactone 25mg at home reports noncompliance, follows with Dr. Bonita Esquivel [facility] [phone] 2 months ago and no history of varices. Most recent BM yesterday.   Presentation: No asterixis, encephalopathy, bleeding; AG 7, Alb 2.4, TBil 2.5, Alk Phos 224, , ALT 23  CXR: Small to moderate right pleural effusion with right basilar opacification compatible with atelectasis or pneumonia. The left lung is clear. The heart is normal in size.  CT angio chest: No pulmonary embolism. Moderate right pleural effusion with atelectatic changes. Cirrhotic liver with indeterminate hypodense lesions. Trace ascites.  CT head: negative  Abdominal US: Bidirectional flow in the main portal vein suggestive of portal hypertension. No evidence of flow in the right portal vein. Thrombosis of the right portal vein cannot be excluded. Cirrhosis. Borderline hepatomegaly  Hepatitis panel negative, ECHO 10/29: normal left and right ventricular size and systolic function.  MELD 20    Plan:  - Daily MELD labs (CMP, Coags)  - CT abdomen with IV contrast  - Hepatology consult, rec PETH, AFP level,  liver MRI, daily weights, urine sodium, daily Is and Os, lactulose titrated to 1 BM per day  - Replete K > 4 and Mg >2  - Continue home Lasix 20mg and increased spironolactone to 50mg daily  - Nutrition Consult, concern for refeeding syndrome if lytes continue to drop despite repletion           - Should consider to d/c oral nutrition supplements and add Consistent Carbohydrate to diet order  - High protein diet  - Low Na diet for ascites

## 2024-10-30 NOTE — PROCEDURE NOTE - AMOUNT OF FLUID OBTAINED
Infant 1100

## 2024-10-30 NOTE — PROGRESS NOTE ADULT - SUBJECTIVE AND OBJECTIVE BOX
INTERVAL HPI/OVERNIGHT EVENTS:  Patient was seen and examined at bedside. Case discussed with attending physician during morning rounds.     As per patient, no o/n events. No complaints at this time. Patient reports some improvement in breathing at this time, denies fevers, chills, chest pain, abdominal pain at this time.     VITAL SIGNS:  T(F): 98.2 (10-30-24 @ 16:22)  HR: 95 (10-30-24 @ 16:22)  BP: 113/77 (10-30-24 @ 16:22)  RR: 17 (10-30-24 @ 16:22)  SpO2: 100% (10-30-24 @ 16:22)  Wt(kg): --    PHYSICAL EXAM:    Constitutional: No acute distress, resting comfortably in bed.    HEENT: No JVD, MMM.  Respiratory: Clear to auscultation bilaterally though with reduced breath sounds on the right lower lung zone.   Cardiovascular: Regular rate and rhythm, normal S1S2, no murmurs, rubs, gallops.  Gastrointestinal: soft, non-tender and non-distended.  Extremities: Warm, well perfused, pulses equal bilateral upper and lower extremities, no edema, no clubbing.  Neurological: AAOx3.  Skin: Normal temperature, warm, dry.    MEDICATIONS  (STANDING):  enoxaparin Injectable 40 milliGRAM(s) SubCutaneous every 24 hours  folic acid 1 milliGRAM(s) Oral daily  furosemide    Tablet 20 milliGRAM(s) Oral every 24 hours  influenza   Vaccine 0.5 milliLiter(s) IntraMuscular once  multivitamin 1 Tablet(s) Oral daily  spironolactone 50 milliGRAM(s) Oral every 24 hours  thiamine IVPB 500 milliGRAM(s) IV Intermittent every 8 hours    MEDICATIONS  (PRN):  LORazepam     Tablet 1 milliGRAM(s) Oral at bedtime PRN for anxiety      Allergies    penicillins (Rash; Urticaria)    Intolerances        LABS:                        10.9   4.44  )-----------( 167      ( 29 Oct 2024 05:30 )             34.1     10-30    134[L]  |  102  |  6[L]  ----------------------------<  71  3.7   |  23  |  0.39[L]    Ca    8.3[L]      30 Oct 2024 05:30  Phos  2.5     10-30  Mg     1.7     10-30    TPro  7.1  /  Alb  2.9[L]  /  TBili  4.8[H]  /  DBili  x   /  AST  109[H]  /  ALT  14  /  AlkPhos  220[H]  10-30    PT/INR - ( 30 Oct 2024 05:30 )   PT: 19.5 sec;   INR: 1.68          PTT - ( 30 Oct 2024 05:30 )  PTT:39.2 sec  Urinalysis Basic - ( 30 Oct 2024 05:30 )    Color: x / Appearance: x / SG: x / pH: x  Gluc: 71 mg/dL / Ketone: x  / Bili: x / Urobili: x   Blood: x / Protein: x / Nitrite: x   Leuk Esterase: x / RBC: x / WBC x   Sq Epi: x / Non Sq Epi: x / Bacteria: x          RADIOLOGY & ADDITIONAL TESTS:  Reviewed INTERVAL HPI/OVERNIGHT EVENTS:  Patient was seen and examined at bedside. Case discussed with attending physician during rounds.     As per patient, no o/n events. No complaints at this time. Patient reports some improvement in breathing at this time, denies fevers, chills, chest pain, abdominal pain at this time.     VITAL SIGNS:  T(F): 98.2 (10-30-24 @ 16:22)  HR: 95 (10-30-24 @ 16:22)  BP: 113/77 (10-30-24 @ 16:22)  RR: 17 (10-30-24 @ 16:22)  SpO2: 100% (10-30-24 @ 16:22)  Wt(kg): --    PHYSICAL EXAM:    Constitutional: No acute distress, resting comfortably in bed.    HEENT: No JVD, MMM.  Respiratory: Clear to auscultation bilaterally though with reduced breath sounds on the right lower lung zone.   Cardiovascular: Regular rate and rhythm, normal S1S2, no murmurs, rubs, gallops.  Gastrointestinal: soft, non-tender and non-distended.  Extremities: Warm, well perfused, pulses equal bilateral upper and lower extremities, no edema, no clubbing.  Neurological: AAOx3.  Skin: Normal temperature, warm, dry.    MEDICATIONS  (STANDING):  enoxaparin Injectable 40 milliGRAM(s) SubCutaneous every 24 hours  folic acid 1 milliGRAM(s) Oral daily  furosemide    Tablet 20 milliGRAM(s) Oral every 24 hours  influenza   Vaccine 0.5 milliLiter(s) IntraMuscular once  multivitamin 1 Tablet(s) Oral daily  spironolactone 50 milliGRAM(s) Oral every 24 hours  thiamine IVPB 500 milliGRAM(s) IV Intermittent every 8 hours    MEDICATIONS  (PRN):  LORazepam     Tablet 1 milliGRAM(s) Oral at bedtime PRN for anxiety      Allergies    penicillins (Rash; Urticaria)    Intolerances        LABS:                        10.9   4.44  )-----------( 167      ( 29 Oct 2024 05:30 )             34.1     10-30    134[L]  |  102  |  6[L]  ----------------------------<  71  3.7   |  23  |  0.39[L]    Ca    8.3[L]      30 Oct 2024 05:30  Phos  2.5     10-30  Mg     1.7     10-30    TPro  7.1  /  Alb  2.9[L]  /  TBili  4.8[H]  /  DBili  x   /  AST  109[H]  /  ALT  14  /  AlkPhos  220[H]  10-30    PT/INR - ( 30 Oct 2024 05:30 )   PT: 19.5 sec;   INR: 1.68          PTT - ( 30 Oct 2024 05:30 )  PTT:39.2 sec  Urinalysis Basic - ( 30 Oct 2024 05:30 )    Color: x / Appearance: x / SG: x / pH: x  Gluc: 71 mg/dL / Ketone: x  / Bili: x / Urobili: x   Blood: x / Protein: x / Nitrite: x   Leuk Esterase: x / RBC: x / WBC x   Sq Epi: x / Non Sq Epi: x / Bacteria: x          RADIOLOGY & ADDITIONAL TESTS:  Reviewed

## 2024-10-30 NOTE — PROGRESS NOTE ADULT - SUBJECTIVE AND OBJECTIVE BOX
HEPATOLOGY PROGRESS NOTE    INTERVAL/SUBJECTIVE:  Reports breathing feels better today, no additional complaints. Not interested in alcohol use pharmacotherapy or programs.    Allergies  penicillins (Rash; Urticaria)    Intolerances      MEDICATIONS:  MEDICATIONS  (STANDING):  enoxaparin Injectable 40 milliGRAM(s) SubCutaneous every 24 hours  folic acid 1 milliGRAM(s) Oral daily  furosemide    Tablet 20 milliGRAM(s) Oral every 24 hours  influenza   Vaccine 0.5 milliLiter(s) IntraMuscular once  multivitamin 1 Tablet(s) Oral daily  spironolactone 50 milliGRAM(s) Oral every 24 hours  thiamine IVPB 500 milliGRAM(s) IV Intermittent every 8 hours    MEDICATIONS  (PRN):  LORazepam     Tablet 1 milliGRAM(s) Oral at bedtime PRN for anxiety    Vital Signs Last 24 Hrs  T(C): 36.7 (30 Oct 2024 14:44), Max: 36.9 (30 Oct 2024 09:48)  T(F): 98 (30 Oct 2024 14:44), Max: 98.5 (30 Oct 2024 09:48)  HR: 89 (30 Oct 2024 14:44) (79 - 96)  BP: 97/65 (30 Oct 2024 14:44) (97/65 - 128/77)  BP(mean): --  RR: 18 (30 Oct 2024 14:44) (17 - 18)  SpO2: 96% (30 Oct 2024 14:44) (94% - 97%)    Parameters below as of 30 Oct 2024 14:44  Patient On (Oxygen Delivery Method): room air        PHYSICAL EXAM:  General: Alert, no acute distress  Lungs: Normal respiratory effort on RA  Abdomen: Soft, nondistended, nontender  Extremities: No edema  Neurological: Moving all extremities spontaneously, no asterixis    LABS:                        10.9   4.44  )-----------( 167      ( 29 Oct 2024 05:30 )             34.1     10-30    134[L]  |  102  |  6[L]  ----------------------------<  71  3.7   |  23  |  0.39[L]    Ca    8.3[L]      30 Oct 2024 05:30  Phos  2.5     10-30  Mg     1.7     10-30    TPro  7.1  /  Alb  2.9[L]  /  TBili  4.8[H]  /  DBili  x   /  AST  109[H]  /  ALT  14  /  AlkPhos  220[H]  10-30    PT/INR - ( 30 Oct 2024 05:30 )   PT: 19.5 sec;   INR: 1.68          PTT - ( 30 Oct 2024 05:30 )  PTT:39.2 sec      RADIOLOGY & ADDITIONAL STUDIES:  Reviewed

## 2024-10-30 NOTE — PROCEDURE NOTE - ADDITIONAL PROCEDURE DETAILS
Pleural fluid identified and appropriate track of needle insertion localized. Lidocaine administered for local anesthesia. Procedure performed with no complications, post procedurally ultrasound of the right side performed to confirm lung sliding. Vital signs without significant hypotension. Chest x-ray performed with no evidence of pneumatothorax.

## 2024-10-30 NOTE — PROGRESS NOTE ADULT - ATTENDING COMMENTS
plan for diagnostic thora per pulm, concern for portal vein thrombosis on US yesterday, pending CTAP w con to further eval.  Ptn no SOB or WOB today, lungs clear on exam, on RA.  Increased diuresis to lucinda 50 and lasix 20, ptn reporting good UOP on dose.  A+Ox4, abd nontender, nondistended, soft.

## 2024-10-30 NOTE — PROGRESS NOTE ADULT - PROBLEM SELECTOR PLAN 3
PMH: drinks 1-2 glasses of wine every other day, last drink Sunday evening (1 glass of wine) no history of seizure, intubation, or withdrawal  Presentation: BECKY [id number] in ED, denies tremors  Continues to score 0 on CIWA    Plan:  - Discontinue CIWA protocol, patient continues to have no signs of withdrawal  - Lorazepam PRN for alcohol withdrawal rather than other benzodiazepines d/t cirrhosis  - Continue thiamine 500mg q8h  - Continue folate 1mg PO daily  - Continue MVI daily  - fall precautions  - SBIRT  - UTox  - Serum drug levels PMH: drinks 1-2 glasses of wine every other day, last drink Sunday evening (1 glass of wine) no history of seizure, intubation, or withdrawal  Presentation: BECKY 472 in ED, denies tremors  Continues to score 0 on CIWA    Plan:  - Discontinue CIWA protocol, patient continues to have no signs of withdrawal  - Lorazepam PRN for alcohol withdrawal rather than other benzodiazepines d/t cirrhosis  - Continue thiamine 500mg q8h  - Continue folate 1mg PO daily  - Continue MVI daily  - Fall precautions  - Serum drug levels

## 2024-10-30 NOTE — PROCEDURE NOTE - NSUS ED ADDITIONAL DETAIL1 FT
Post- Thoracentesis pleural effusion size appears reduced with appropriate lung sliding on the right anterior chest wall.

## 2024-10-30 NOTE — PROGRESS NOTE ADULT - ATTENDING COMMENTS
Status post right pleural effusion thoracentesis, pseudo exudative nature of the fluid, lymphocytic predominant nucleated, cells < 500, spontaneous bacterial empyema ruled out. Follow cultures. Wean O2 as tolerated.  Diuresis per the primary team.    Pulmonary medicine will sign off.  Please reach out to us with questions.  Thank you for allowing us to participate in the care of your patient. Status post right pleural effusion thoracentesis, pseudo exudative nature of the fluid, lymphocytic predominant, nucleated cells < 500, spontaneous bacterial empyema ruled out. Follow cultures. Wean O2 as tolerated.  Diuresis per the primary team.    Pulmonary medicine will sign off.  Please reach out to us with questions.  Thank you for allowing us to participate in the care of your patient.

## 2024-10-30 NOTE — PROGRESS NOTE ADULT - PROBLEM SELECTOR PLAN 8
PMH: takes lorazepam 1mg HS PRN for anxiety  Presentation: denies anxiety    Plan  - continue home lorazepam PRN

## 2024-10-30 NOTE — PROGRESS NOTE ADULT - PROBLEM SELECTOR PLAN 4
PMH: R-sided pleuritic CP without radiation, no palpitations; no significant cardiac history  Etiology: likely secondary to pressure/fluid buildup due to right pleural effusion  EKG in ED (10/28): TWI in Lead III, [id number]  EKG repeat: TWI in Lead III  Trop 5 to 7    Plan:  - Continue to monitor  - Per hepatology, for pain can use Tylenol up to 2g/day but avoid NSAIDs and limit opioids

## 2024-10-30 NOTE — PROGRESS NOTE ADULT - PROBLEM SELECTOR PLAN 6
PMH: No history of pancreatitis  Presentation: no epigastric pain radiating to back, no signs on imaging  Lipase- 94    Plan:  - Does not meet criteria for pancreatitis; no need to monitor

## 2024-10-30 NOTE — PROGRESS NOTE ADULT - PROBLEM SELECTOR PLAN 7
PMH: HgB 12.8 in March 2024, patient reports history of long-standing anemia  Etiology: likely secondary to alcohol use/vitamin deficiency  Hgb 10.3,   Folate 8.9, B12 489    Plan:  - Maintain active type/screen, Transfuse if hgb <7

## 2024-10-30 NOTE — PROGRESS NOTE ADULT - ASSESSMENT
Ms. Brothers is a 44-year-old female with history of chronic bronchitis since childhood (on inhalers, rarely takes), anemia,  EtOH abuse (no h/o seizure, withdrawal, intubation)and cirrhosis presents with pleuritic chest pain for 1 day.  Patient states that she has had paracentesis and thoracentesis once before approximately 3 years ago, currently being managed with 'water pills' and she is concerned she has fluid in her lungs again.  Pulmonology consulted for evaluation of unilateral pleural effusion.     Additional pulmonary history:  Patient notes she had a chest tube placed about 2 years ago with [facility], and it was felt that the effusion was related to liver disease. Has not had any repeat procedures since then. Does feel dyspnea but improving with diuretics. She denies fevers, chills, productive cough, weight loss. She does not have a pulmonologist. On bedside ultrasound assessment there is a large simple pleural effusion. No ascites appreciated     Differentials for this effusion includes transudative 2/2 cirrhosis, and there is a possibility of spontaneous bacterial empyema in setting of cirrhosis although ultrasound features are simple. Hepatohydrothorax is less likely without large ascites. Other differentials that are less likely include parapneumonic effusion, and malignant effusion.    Plan:  - Diagnostic thoracentesis performed, lights criteria showing evidence of exudative effusion.  - Follow up cell count, cultures for discussion on empiric initiation of SBE treatment.   - Pulmonary to follow.   - appreciate GI recs  - obtain records from [place] if possible regarding pleural fluid studies    Patient was seen, evaluated and discussed with PCCM attending.  Please page pulmonary if there are any questions with above recommendations.  Ms. Brothers is a 44-year-old female with history of chronic bronchitis since childhood (on inhalers, rarely takes), anemia,  EtOH abuse (no h/o seizure, withdrawal, intubation)and cirrhosis presents with pleuritic chest pain for 1 day.  Patient states that she has had paracentesis and thoracentesis once before approximately 3 years ago, currently being managed with 'water pills' and she is concerned she has fluid in her lungs again.  Pulmonology consulted for evaluation of unilateral pleural effusion.     Additional pulmonary history:  Patient notes she had a chest tube placed about 2 years ago with [facility], and it was felt that the effusion was related to liver disease. Has not had any repeat procedures since then. Does feel dyspnea but improving with diuretics. She denies fevers, chills, productive cough, weight loss. She does not have a pulmonologist. On bedside ultrasound assessment there is a large simple pleural effusion. No ascites appreciated     Differentials for this effusion includes transudative 2/2 cirrhosis, and there is a possibility of spontaneous bacterial empyema in setting of cirrhosis although ultrasound features are simple. Hepatohydrothorax is less likely without large ascites. Other differentials that are less likely include parapneumonic effusion, and malignant effusion.    Plan:  - Diagnostic thoracentesis performed, lights criteria showing evidence of exudative effusion though notably Serum protein albumin gradient/serum-pleural protein gradient appears to agree with pseudoexudative effusion which makes sense clinically in this patient.   - Follow up cell count, cultures.  - Pulmonary to follow.   - appreciate GI recs  - obtain records from [place] if possible regarding pleural fluid studies    Patient was seen, evaluated and discussed with PCCM attending.  Please page pulmonary if there are any questions with above recommendations.

## 2024-10-30 NOTE — PROGRESS NOTE ADULT - SUBJECTIVE AND OBJECTIVE BOX
Patient is a 44y old  Female who presents with a chief complaint of etOH intoxication and frequent falls (29 Oct 2024 14:03)      HOSPITAL COURSE:     OVERNIGHT EVENTS:    SUBJECTIVE:     ROS: otherwise negative      T(C): 36.9 (10-30-24 @ 09:48), Max: 36.9 (10-30-24 @ 09:48)  HR: 84 (10-30-24 @ 09:48) (79 - 96)  BP: 124/84 (10-30-24 @ 09:48) (124/84 - 128/77)  RR: 18 (10-30-24 @ 09:48) (17 - 18)  SpO2: 97% (10-30-24 @ 09:48) (94% - 97%)  Wt(kg): --Vital Signs Last 24 Hrs  T(C): 36.9 (30 Oct 2024 09:48), Max: 36.9 (30 Oct 2024 09:48)  T(F): 98.5 (30 Oct 2024 09:48), Max: 98.5 (30 Oct 2024 09:48)  HR: 84 (30 Oct 2024 09:48) (79 - 96)  BP: 124/84 (30 Oct 2024 09:48) (124/84 - 128/77)  BP(mean): --  RR: 18 (30 Oct 2024 09:48) (17 - 18)  SpO2: 97% (30 Oct 2024 09:48) (94% - 97%)    Parameters below as of 30 Oct 2024 09:48  Patient On (Oxygen Delivery Method): room air        PHYSICAL EXAM:  Constitutional: resting comfortably in bed; NAD  Head: NC/AT  Eyes: PERRL, EOMI, anicteric sclera  ENT: no nasal discharge; MMM  Neck: supple; no JVD or thyromegaly  Respiratory: CTA B/L; no W/R/R, no retractions  Cardiac: +S1/S2; RRR; no M/R/G  Gastrointestinal: soft, NT/ND; no rebound or guarding; +BSx4  Back: spine midline, no bony tenderness or step-offs; no CVAT B/L  Extremities: WWP, no clubbing or cyanosis; no peripheral edema. Capillary refill <2 sec  Musculoskeletal: NROM x4; no joint swelling, tenderness or erythema  Vascular: 2+ radial, DP/PT pulses B/L  Dermatologic: skin warm, dry and intact; no rashes, wounds, or scars  Lymphatic: no submandibular or cervical LAD  Neurologic: AAOx3; CNII-XII grossly intact; no focal deficits  Psychiatric: affect and characteristics of appearance, verbalizations, behaviors are appropriate    LABS:                        10.9   4.44  )-----------( 167      ( 29 Oct 2024 05:30 )             34.1     10-30    134[L]  |  102  |  6[L]  ----------------------------<  71  3.7   |  23  |  0.39[L]    Ca    8.3[L]      30 Oct 2024 05:30  Phos  2.0     10-29  Mg     2.2     10-29    TPro  7.1  /  Alb  2.9[L]  /  TBili  4.8[H]  /  DBili  x   /  AST  109[H]  /  ALT  14  /  AlkPhos  220[H]  10-30      PT/INR - ( 30 Oct 2024 05:30 )   PT: 19.5 sec;   INR: 1.68          PTT - ( 30 Oct 2024 05:30 )  PTT:39.2 sec  Urinalysis Basic - ( 30 Oct 2024 05:30 )    Color: x / Appearance: x / SG: x / pH: x  Gluc: 71 mg/dL / Ketone: x  / Bili: x / Urobili: x   Blood: x / Protein: x / Nitrite: x   Leuk Esterase: x / RBC: x / WBC x   Sq Epi: x / Non Sq Epi: x / Bacteria: x      CAPILLARY BLOOD GLUCOSE            Urinalysis Basic - ( 30 Oct 2024 05:30 )    Color: x / Appearance: x / SG: x / pH: x  Gluc: 71 mg/dL / Ketone: x  / Bili: x / Urobili: x   Blood: x / Protein: x / Nitrite: x   Leuk Esterase: x / RBC: x / WBC x   Sq Epi: x / Non Sq Epi: x / Bacteria: x        MEDICATIONS  (STANDING):  enoxaparin Injectable 40 milliGRAM(s) SubCutaneous every 24 hours  folic acid 1 milliGRAM(s) Oral daily  furosemide    Tablet 20 milliGRAM(s) Oral every 24 hours  influenza   Vaccine 0.5 milliLiter(s) IntraMuscular once  multivitamin 1 Tablet(s) Oral daily  potassium chloride   Powder 40 milliEquivalent(s) Oral once  spironolactone 50 milliGRAM(s) Oral every 24 hours  thiamine IVPB 500 milliGRAM(s) IV Intermittent every 8 hours    MEDICATIONS  (PRN):  LORazepam     Tablet 1 milliGRAM(s) Oral at bedtime PRN for anxiety      RADIOLOGY & ADDITIONAL TESTS: Reviewed   INTERVAL HPI/OVERNIGHT EVENTS: Patient seen and examined at bedside, resting comfortably in bed, and does not appear to be in any acute distress. Patient reports improvement to right-sided pleuritic chest pain which now only occurs when moving and lying on the affected side. She is well on RA. Denies SOB, palpitations, dyspnea, fevers, night sweats, chills, n/v/d/c, dysuria, and urinary frequency.     ROS: otherwise negative    T(C): 36.9 (10-30-24 @ 09:48), Max: 36.9 (10-30-24 @ 09:48)  HR: 84 (10-30-24 @ 09:48) (79 - 96)  BP: 124/84 (10-30-24 @ 09:48) (124/84 - 128/77)  RR: 18 (10-30-24 @ 09:48) (17 - 18)  SpO2: 97% (10-30-24 @ 09:48) (94% - 97%)  Wt(kg): --Vital Signs Last 24 Hrs  T(C): 36.9 (30 Oct 2024 09:48), Max: 36.9 (30 Oct 2024 09:48)  T(F): 98.5 (30 Oct 2024 09:48), Max: 98.5 (30 Oct 2024 09:48)  HR: 84 (30 Oct 2024 09:48) (79 - 96)  BP: 124/84 (30 Oct 2024 09:48) (124/84 - 128/77)  BP(mean): --  RR: 18 (30 Oct 2024 09:48) (17 - 18)  SpO2: 97% (30 Oct 2024 09:48) (94% - 97%)    Parameters below as of 30 Oct 2024 09:48  Patient On (Oxygen Delivery Method): room air        PHYSICAL EXAM:  Constitutional: resting comfortably in bed; NAD  Head: NC/AT  Eyes: PERRL, EOMI, anicteric sclera  ENT: no nasal discharge; MMM  Neck: supple; no JVD or thyromegaly  Respiratory: CTA B/L; no W/R/R, no retractions  Cardiac: +S1/S2; RRR; no M/R/G  Gastrointestinal: soft, NT/ND; no rebound or guarding; +BSx4  Back: spine midline, no bony tenderness or step-offs; no CVAT B/L  Extremities: WWP, no clubbing or cyanosis; no peripheral edema. Capillary refill <2 sec  Musculoskeletal: NROM x4; no joint swelling, tenderness or erythema  Vascular: 2+ radial, DP/PT pulses B/L  Dermatologic: skin warm, dry and intact; no rashes, wounds, or scars  Lymphatic: no submandibular or cervical LAD  Neurologic: AAOx3; CNII-XII grossly intact; no focal deficits  Psychiatric: affect and characteristics of appearance, verbalizations, behaviors are appropriate    LABS:                        10.9   4.44  )-----------( 167      ( 29 Oct 2024 05:30 )             34.1     10-30    134[L]  |  102  |  6[L]  ----------------------------<  71  3.7   |  23  |  0.39[L]    Ca    8.3[L]      30 Oct 2024 05:30  Phos  2.0     10-29  Mg     2.2     10-29    TPro  7.1  /  Alb  2.9[L]  /  TBili  4.8[H]  /  DBili  x   /  AST  109[H]  /  ALT  14  /  AlkPhos  220[H]  10-30      PT/INR - ( 30 Oct 2024 05:30 )   PT: 19.5 sec;   INR: 1.68          PTT - ( 30 Oct 2024 05:30 )  PTT:39.2 sec  Urinalysis Basic - ( 30 Oct 2024 05:30 )    Color: x / Appearance: x / SG: x / pH: x  Gluc: 71 mg/dL / Ketone: x  / Bili: x / Urobili: x   Blood: x / Protein: x / Nitrite: x   Leuk Esterase: x / RBC: x / WBC x   Sq Epi: x / Non Sq Epi: x / Bacteria: x    Urinalysis Basic - ( 30 Oct 2024 05:30 )    Color: x / Appearance: x / SG: x / pH: x  Gluc: 71 mg/dL / Ketone: x  / Bili: x / Urobili: x   Blood: x / Protein: x / Nitrite: x   Leuk Esterase: x / RBC: x / WBC x   Sq Epi: x / Non Sq Epi: x / Bacteria: x        MEDICATIONS  (STANDING):  enoxaparin Injectable 40 milliGRAM(s) SubCutaneous every 24 hours  folic acid 1 milliGRAM(s) Oral daily  furosemide    Tablet 20 milliGRAM(s) Oral every 24 hours  influenza   Vaccine 0.5 milliLiter(s) IntraMuscular once  multivitamin 1 Tablet(s) Oral daily  potassium chloride   Powder 40 milliEquivalent(s) Oral once  spironolactone 50 milliGRAM(s) Oral every 24 hours  thiamine IVPB 500 milliGRAM(s) IV Intermittent every 8 hours    MEDICATIONS  (PRN):  LORazepam     Tablet 1 milliGRAM(s) Oral at bedtime PRN for anxiety      RADIOLOGY & ADDITIONAL TESTS: Reviewed   INTERVAL HPI/OVERNIGHT EVENTS: Patient seen and examined at bedside, resting comfortably in bed, and does not appear to be in any acute distress. Patient reports improvement to right-sided pleuritic chest pain which now only occurs when moving and lying on the affected side. She is saturating well on RA. Denies SOB, palpitations, dyspnea, fevers, night sweats, chills, n/v/d/c, dysuria, and urinary frequency.     ROS: otherwise negative    T(C): 36.9 (10-30-24 @ 09:48), Max: 36.9 (10-30-24 @ 09:48)  HR: 84 (10-30-24 @ 09:48) (79 - 96)  BP: 124/84 (10-30-24 @ 09:48) (124/84 - 128/77)  RR: 18 (10-30-24 @ 09:48) (17 - 18)  SpO2: 97% (10-30-24 @ 09:48) (94% - 97%)  Wt(kg): --Vital Signs Last 24 Hrs  T(C): 36.9 (30 Oct 2024 09:48), Max: 36.9 (30 Oct 2024 09:48)  T(F): 98.5 (30 Oct 2024 09:48), Max: 98.5 (30 Oct 2024 09:48)  HR: 84 (30 Oct 2024 09:48) (79 - 96)  BP: 124/84 (30 Oct 2024 09:48) (124/84 - 128/77)  BP(mean): --  RR: 18 (30 Oct 2024 09:48) (17 - 18)  SpO2: 97% (30 Oct 2024 09:48) (94% - 97%)    Parameters below as of 30 Oct 2024 09:48  Patient On (Oxygen Delivery Method): room air    PHYSICAL EXAM:  General: bruising present over entire body, notably left periorbital and frontal ecchymosis, variable sized circular bruises on bilateral upper extremities, bilateral anterior shins, and posterior thighs  Eyes: pupils equal and reactive to light, EOMI intact bilaterally. Anicteric sclerae, moist conjunctivae  HENT: Moist mucous membranes  Neck: Trachea midline, supple  Lungs: CTA B/L. No wheezes, rales, or rhonchi  Cardiovascular: RRR. No murmurs, rubs, or gallops  Abdomen: moderately distended abdomen; R>L flank dullness to percussion; shifting dullness present, absence of fluid wave; liver edge palpated below right costal margin; No rebound or guarding  Extremities: WWP, No clubbing, cyanosis or edema  Neurological: Alert and oriented  Skin: Warm and dry. bruises as noted above    LABS:                        10.9   4.44  )-----------( 167      ( 29 Oct 2024 05:30 )             34.1     10-30    134[L]  |  102  |  6[L]  ----------------------------<  71  3.7   |  23  |  0.39[L]    Ca    8.3[L]      30 Oct 2024 05:30  Phos  2.0     10-29  Mg     2.2     10-29    TPro  7.1  /  Alb  2.9[L]  /  TBili  4.8[H]  /  DBili  x   /  AST  109[H]  /  ALT  14  /  AlkPhos  220[H]  10-30      PT/INR - ( 30 Oct 2024 05:30 )   PT: 19.5 sec;   INR: 1.68          PTT - ( 30 Oct 2024 05:30 )  PTT:39.2 sec  Urinalysis Basic - ( 30 Oct 2024 05:30 )    Color: x / Appearance: x / SG: x / pH: x  Gluc: 71 mg/dL / Ketone: x  / Bili: x / Urobili: x   Blood: x / Protein: x / Nitrite: x   Leuk Esterase: x / RBC: x / WBC x   Sq Epi: x / Non Sq Epi: x / Bacteria: x    Urinalysis Basic - ( 30 Oct 2024 05:30 )    Color: x / Appearance: x / SG: x / pH: x  Gluc: 71 mg/dL / Ketone: x  / Bili: x / Urobili: x   Blood: x / Protein: x / Nitrite: x   Leuk Esterase: x / RBC: x / WBC x   Sq Epi: x / Non Sq Epi: x / Bacteria: x        MEDICATIONS  (STANDING):  enoxaparin Injectable 40 milliGRAM(s) SubCutaneous every 24 hours  folic acid 1 milliGRAM(s) Oral daily  furosemide    Tablet 20 milliGRAM(s) Oral every 24 hours  influenza   Vaccine 0.5 milliLiter(s) IntraMuscular once  multivitamin 1 Tablet(s) Oral daily  potassium chloride   Powder 40 milliEquivalent(s) Oral once  spironolactone 50 milliGRAM(s) Oral every 24 hours  thiamine IVPB 500 milliGRAM(s) IV Intermittent every 8 hours    MEDICATIONS  (PRN):  LORazepam     Tablet 1 milliGRAM(s) Oral at bedtime PRN for anxiety      RADIOLOGY & ADDITIONAL TESTS: Reviewed

## 2024-10-31 VITALS
TEMPERATURE: 99 F | RESPIRATION RATE: 17 BRPM | OXYGEN SATURATION: 98 % | DIASTOLIC BLOOD PRESSURE: 71 MMHG | SYSTOLIC BLOOD PRESSURE: 106 MMHG | HEART RATE: 81 BPM

## 2024-10-31 LAB
AFP-TM SERPL-MCNC: 9.4 NG/ML — HIGH
ALBUMIN SERPL ELPH-MCNC: 3.1 G/DL — LOW (ref 3.3–5)
ALP SERPL-CCNC: 229 U/L — HIGH (ref 40–120)
ALT FLD-CCNC: 14 U/L — SIGNIFICANT CHANGE UP (ref 10–45)
ANION GAP SERPL CALC-SCNC: 11 MMOL/L — SIGNIFICANT CHANGE UP (ref 5–17)
APTT BLD: 38.1 SEC — HIGH (ref 24.5–35.6)
AST SERPL-CCNC: 109 U/L — HIGH (ref 10–40)
BASOPHILS # BLD AUTO: 0.1 K/UL — SIGNIFICANT CHANGE UP (ref 0–0.2)
BASOPHILS NFR BLD AUTO: 1.2 % — SIGNIFICANT CHANGE UP (ref 0–2)
BILIRUB SERPL-MCNC: 4.3 MG/DL — HIGH (ref 0.2–1.2)
BUN SERPL-MCNC: 7 MG/DL — SIGNIFICANT CHANGE UP (ref 7–23)
CALCIUM SERPL-MCNC: 8.5 MG/DL — SIGNIFICANT CHANGE UP (ref 8.4–10.5)
CHLORIDE SERPL-SCNC: 101 MMOL/L — SIGNIFICANT CHANGE UP (ref 96–108)
CHOLEST FLD-MCNC: 4 MG/DL — SIGNIFICANT CHANGE UP
CO2 SERPL-SCNC: 18 MMOL/L — LOW (ref 22–31)
CREAT SERPL-MCNC: 0.47 MG/DL — LOW (ref 0.5–1.3)
EGFR: 120 ML/MIN/1.73M2 — SIGNIFICANT CHANGE UP
EOSINOPHIL # BLD AUTO: 0.08 K/UL — SIGNIFICANT CHANGE UP (ref 0–0.5)
EOSINOPHIL NFR BLD AUTO: 1 % — SIGNIFICANT CHANGE UP (ref 0–6)
GLUCOSE SERPL-MCNC: 88 MG/DL — SIGNIFICANT CHANGE UP (ref 70–99)
GRAM STN FLD: SIGNIFICANT CHANGE UP
HCG SERPL-ACNC: 1 MIU/ML — SIGNIFICANT CHANGE UP
HCT VFR BLD CALC: 34 % — LOW (ref 34.5–45)
HGB BLD-MCNC: 10.9 G/DL — LOW (ref 11.5–15.5)
IMM GRANULOCYTES NFR BLD AUTO: 0.4 % — SIGNIFICANT CHANGE UP (ref 0–0.9)
INR BLD: 1.77 — HIGH (ref 0.85–1.16)
LYMPHOCYTES # BLD AUTO: 1.9 K/UL — SIGNIFICANT CHANGE UP (ref 1–3.3)
LYMPHOCYTES # BLD AUTO: 23 % — SIGNIFICANT CHANGE UP (ref 13–44)
MAGNESIUM SERPL-MCNC: 1.4 MG/DL — LOW (ref 1.6–2.6)
MCHC RBC-ENTMCNC: 32.1 G/DL — SIGNIFICANT CHANGE UP (ref 32–36)
MCHC RBC-ENTMCNC: 36.7 PG — HIGH (ref 27–34)
MCV RBC AUTO: 114.5 FL — HIGH (ref 80–100)
MONOCYTES # BLD AUTO: 0.5 K/UL — SIGNIFICANT CHANGE UP (ref 0–0.9)
MONOCYTES NFR BLD AUTO: 6.1 % — SIGNIFICANT CHANGE UP (ref 2–14)
NEUTROPHILS # BLD AUTO: 5.64 K/UL — SIGNIFICANT CHANGE UP (ref 1.8–7.4)
NEUTROPHILS NFR BLD AUTO: 68.3 % — SIGNIFICANT CHANGE UP (ref 43–77)
NRBC # BLD: 0 /100 WBCS — SIGNIFICANT CHANGE UP (ref 0–0)
PHOSPHATE SERPL-MCNC: 3.2 MG/DL — SIGNIFICANT CHANGE UP (ref 2.5–4.5)
PLATELET # BLD AUTO: 185 K/UL — SIGNIFICANT CHANGE UP (ref 150–400)
POTASSIUM SERPL-MCNC: 4 MMOL/L — SIGNIFICANT CHANGE UP (ref 3.5–5.3)
POTASSIUM SERPL-SCNC: 4 MMOL/L — SIGNIFICANT CHANGE UP (ref 3.5–5.3)
PROT SERPL-MCNC: 7.9 G/DL — SIGNIFICANT CHANGE UP (ref 6–8.3)
PROTHROM AB SERPL-ACNC: 20.2 SEC — HIGH (ref 9.9–13.4)
RBC # BLD: 2.97 M/UL — LOW (ref 3.8–5.2)
RBC # FLD: 12.8 % — SIGNIFICANT CHANGE UP (ref 10.3–14.5)
SODIUM SERPL-SCNC: 130 MMOL/L — LOW (ref 135–145)
SPECIMEN SOURCE: SIGNIFICANT CHANGE UP
TRIGL FLD-MCNC: 82 MG/DL — SIGNIFICANT CHANGE UP
WBC # BLD: 8.25 K/UL — SIGNIFICANT CHANGE UP (ref 3.8–10.5)
WBC # FLD AUTO: 8.25 K/UL — SIGNIFICANT CHANGE UP (ref 3.8–10.5)

## 2024-10-31 PROCEDURE — 84484 ASSAY OF TROPONIN QUANT: CPT

## 2024-10-31 PROCEDURE — 83615 LACTATE (LD) (LDH) ENZYME: CPT

## 2024-10-31 PROCEDURE — 70450 CT HEAD/BRAIN W/O DYE: CPT | Mod: MC

## 2024-10-31 PROCEDURE — 85730 THROMBOPLASTIN TIME PARTIAL: CPT

## 2024-10-31 PROCEDURE — 80321 ALCOHOLS BIOMARKERS 1OR 2: CPT

## 2024-10-31 PROCEDURE — 83880 ASSAY OF NATRIURETIC PEPTIDE: CPT

## 2024-10-31 PROCEDURE — C8929: CPT

## 2024-10-31 PROCEDURE — 85610 PROTHROMBIN TIME: CPT

## 2024-10-31 PROCEDURE — 87075 CULTR BACTERIA EXCEPT BLOOD: CPT

## 2024-10-31 PROCEDURE — 84157 ASSAY OF PROTEIN OTHER: CPT

## 2024-10-31 PROCEDURE — 99285 EMERGENCY DEPT VISIT HI MDM: CPT

## 2024-10-31 PROCEDURE — 83735 ASSAY OF MAGNESIUM: CPT

## 2024-10-31 PROCEDURE — 82746 ASSAY OF FOLIC ACID SERUM: CPT

## 2024-10-31 PROCEDURE — 80307 DRUG TEST PRSMV CHEM ANLYZR: CPT

## 2024-10-31 PROCEDURE — 83986 ASSAY PH BODY FLUID NOS: CPT

## 2024-10-31 PROCEDURE — 87015 SPECIMEN INFECT AGNT CONCNTJ: CPT

## 2024-10-31 PROCEDURE — 82105 ALPHA-FETOPROTEIN SERUM: CPT

## 2024-10-31 PROCEDURE — 97116 GAIT TRAINING THERAPY: CPT

## 2024-10-31 PROCEDURE — 87205 SMEAR GRAM STAIN: CPT

## 2024-10-31 PROCEDURE — 88112 CYTOPATH CELL ENHANCE TECH: CPT

## 2024-10-31 PROCEDURE — 86850 RBC ANTIBODY SCREEN: CPT

## 2024-10-31 PROCEDURE — 96374 THER/PROPH/DIAG INJ IV PUSH: CPT

## 2024-10-31 PROCEDURE — 82945 GLUCOSE OTHER FLUID: CPT

## 2024-10-31 PROCEDURE — 86901 BLOOD TYPING SEROLOGIC RH(D): CPT

## 2024-10-31 PROCEDURE — 99239 HOSP IP/OBS DSCHRG MGMT >30: CPT | Mod: GC

## 2024-10-31 PROCEDURE — 99232 SBSQ HOSP IP/OBS MODERATE 35: CPT

## 2024-10-31 PROCEDURE — 93975 VASCULAR STUDY: CPT

## 2024-10-31 PROCEDURE — 88305 TISSUE EXAM BY PATHOLOGIST: CPT

## 2024-10-31 PROCEDURE — 97161 PT EVAL LOW COMPLEX 20 MIN: CPT

## 2024-10-31 PROCEDURE — 85379 FIBRIN DEGRADATION QUANT: CPT

## 2024-10-31 PROCEDURE — 89051 BODY FLUID CELL COUNT: CPT

## 2024-10-31 PROCEDURE — 82042 OTHER SOURCE ALBUMIN QUAN EA: CPT

## 2024-10-31 PROCEDURE — 82150 ASSAY OF AMYLASE: CPT

## 2024-10-31 PROCEDURE — 86900 BLOOD TYPING SEROLOGIC ABO: CPT

## 2024-10-31 PROCEDURE — 71046 X-RAY EXAM CHEST 2 VIEWS: CPT

## 2024-10-31 PROCEDURE — 87102 FUNGUS ISOLATION CULTURE: CPT

## 2024-10-31 PROCEDURE — 84100 ASSAY OF PHOSPHORUS: CPT

## 2024-10-31 PROCEDURE — 85025 COMPLETE CBC W/AUTO DIFF WBC: CPT

## 2024-10-31 PROCEDURE — 83690 ASSAY OF LIPASE: CPT

## 2024-10-31 PROCEDURE — 87070 CULTURE OTHR SPECIMN AEROBIC: CPT

## 2024-10-31 PROCEDURE — 84702 CHORIONIC GONADOTROPIN TEST: CPT

## 2024-10-31 PROCEDURE — 36415 COLL VENOUS BLD VENIPUNCTURE: CPT

## 2024-10-31 PROCEDURE — 84311 SPECTROPHOTOMETRY: CPT

## 2024-10-31 PROCEDURE — 82607 VITAMIN B-12: CPT

## 2024-10-31 PROCEDURE — 84478 ASSAY OF TRIGLYCERIDES: CPT

## 2024-10-31 PROCEDURE — 71275 CT ANGIOGRAPHY CHEST: CPT | Mod: MC

## 2024-10-31 PROCEDURE — 71045 X-RAY EXAM CHEST 1 VIEW: CPT

## 2024-10-31 PROCEDURE — 80074 ACUTE HEPATITIS PANEL: CPT

## 2024-10-31 PROCEDURE — 84155 ASSAY OF PROTEIN SERUM: CPT

## 2024-10-31 PROCEDURE — 80053 COMPREHEN METABOLIC PANEL: CPT

## 2024-10-31 PROCEDURE — 96375 TX/PRO/DX INJ NEW DRUG ADDON: CPT

## 2024-10-31 RX ORDER — FUROSEMIDE 40 MG
1 TABLET ORAL
Qty: 14 | Refills: 0
Start: 2024-10-31 | End: 2024-11-13

## 2024-10-31 RX ORDER — MAGNESIUM OXIDE 400 MG/1
800 TABLET ORAL ONCE
Refills: 0 | Status: COMPLETED | OUTPATIENT
Start: 2024-10-31 | End: 2024-10-31

## 2024-10-31 RX ORDER — SPIRONOLACTONE 100 MG
1 TABLET ORAL
Qty: 14 | Refills: 0
Start: 2024-10-31 | End: 2024-11-13

## 2024-10-31 RX ORDER — SPIRONOLACTONE 100 MG
1 TABLET ORAL
Refills: 0 | DISCHARGE

## 2024-10-31 RX ORDER — FUROSEMIDE 40 MG
1 TABLET ORAL
Refills: 0 | DISCHARGE

## 2024-10-31 RX ORDER — MAGNESIUM GLUCONATE 27.5 (500)
800 TABLET ORAL ONCE
Refills: 0 | Status: DISCONTINUED | OUTPATIENT
Start: 2024-10-31 | End: 2024-10-31

## 2024-10-31 RX ADMIN — FOLIC ACID 1 MILLIGRAM(S): 1 TABLET ORAL at 11:04

## 2024-10-31 RX ADMIN — Medication 1 TABLET(S): at 11:04

## 2024-10-31 RX ADMIN — Medication 20 MILLIGRAM(S): at 06:34

## 2024-10-31 RX ADMIN — Medication 105 MILLIGRAM(S): at 06:34

## 2024-10-31 RX ADMIN — MAGNESIUM OXIDE 800 MILLIGRAM(S): 400 TABLET ORAL at 14:01

## 2024-10-31 NOTE — PROGRESS NOTE ADULT - ASSESSMENT
44F with EtOH cirrhosis (c/b ascites, ?hydrothorax), active EtOH use, chronic bronchitis since childhood p/w pleuritic chest discomfort x 3-4d, found with R pleural effusion.     Effusion currently favored to be cirrhosis . Appreciate pulm input.     She reports less alcohol use than what one would expect to cause EtOH cirrhosis, so it is possible that either there is another contributory factor or she is underreporting her alcohol intake (serum alcohol >400 on admission) for which PETH and collateral from her outpatient GI can clarify. Regardless of the etiology we discussed that she needs to abstain from further alcohol use as this could lead to further progressive irreversible liver damage. She is not interested in medications or programs for alcohol use.    We reviewed that her ultrasound showing possible PVT, elevated AFP, and indeterminate liver CT findings would require further evaluation with MRI liver for HCC and portal vein thrombus. Discussed with pt and primary team with plan to obtain outpatient. We discussed with patient importance of scheduling timely follow-up with her outpatient GI and she expressed understanding. Primary team will also try to call to expedite appt and give warm handoff to her outpatient GI.    Recommendations:  - F/u PETH  - Daily weight, strict I/O, and replete K > 4 and Mg >2  - Continue furosemide 20mg + spironolactone 50mg daily  - For pain control can give Tylenol up to 2g/day but avoid NSAIDs and limit opioids  - Pt should have timely follow-up with her outpatient GI/hep Dr. Bonita Esquivel and should have MRI liver to evaluate for HCC/PVT    We will continue to follow. Please see attending addendum for final recommendations.     Yuniel (Hudson) MD [place]  Gastroenterology Fellow  GI consult pager (M-F 0v-8p): [phone]  Please call  for on-call fellow after hours   44F with EtOH cirrhosis (c/b ascites, ?hydrothorax), active EtOH use, chronic bronchitis since childhood p/w pleuritic chest discomfort x 3-4d, found with R pleural effusion.     Effusion currently favored to be hydrothorax with fluid studies appearing pseudoexudative in setting of diuretics. Appreciate pulm input.     She reports less alcohol use than what one would expect to cause EtOH cirrhosis, so it is possible that either there is another contributory factor or she is underreporting her alcohol intake (serum alcohol >400 on admission) for which PETH and collateral from her outpatient GI can clarify. Regardless of the etiology we discussed that she needs to abstain from further alcohol use as this could lead to further progressive irreversible liver damage. She is not interested in medications or programs for alcohol use.    We reviewed that her ultrasound showing possible PVT, elevated AFP, and indeterminate liver CT findings would require further evaluation with MRI liver for HCC and portal vein thrombus. Discussed with pt and primary team with plan to obtain outpatient. We discussed with patient importance of scheduling timely follow-up with her outpatient GI and she expressed understanding. Primary team will also try to call to expedite appt and give warm handoff to her outpatient GI.    Recommendations:  - F/u PETH  - Daily weight, strict I/O, and replete K > 4 and Mg >2  - Continue furosemide 20mg + spironolactone 50mg daily  - For pain control can give Tylenol up to 2g/day but avoid NSAIDs and limit opioids  - Pt should have timely follow-up with her outpatient GI/hep Dr. Bonita Esquivel and should have MRI liver to evaluate for HCC/PVT    We will continue to follow. Please see attending addendum for final recommendations.     Yuniel (Marie Potts MD  Gastroenterology Fellow  GI consult pager (M-F 7a-3r): [phone]  Please call  for on-call fellow after hours

## 2024-10-31 NOTE — PROGRESS NOTE ADULT - PROBLEM SELECTOR PLAN 3
PMH: drinks 1-2 glasses of wine every other day, last drink Sunday evening (1 glass of wine) no history of seizure, intubation, or withdrawal  Presentation: BECKY [id number] in ED, denies tremors  Per hepatology, If necessary, lorazepam PRN for alcohol withdrawal rather than other benzodiazepines d/t cirrhosis  Discontinued CIWA 10/30, patient consistently scoring 0    Plan:  - Continue thiamine 500mg q8h  - Continue folate 1mg PO daily  - Continue MVI daily  - Fall precautions  - Serum drug levels

## 2024-10-31 NOTE — PROGRESS NOTE ADULT - PROBLEM SELECTOR PLAN 10
Presentation: phosphorous 2.0  Etiology: likely secondary to refeeding syndrome  S/p potassium phosphate / sodium phosphate Powder one packet 10/29  3.2 this AM - resolved    Plan:  - Replete as needed  - Continue to monitor
Presentation: phosphorous 2.0  Etiology: likely secondary to refeeding syndrome  S/p potassium phosphate / sodium phosphate Powder one packet 10/29    Plan:  - Replete as needed  - Continue to monitor
Presentation: phosphorous 2.0  Etiology: likely secondary to refeeding syndrome    Plan:  - Potassium phosphate / sodium phosphate Powder one packet today  - Replete as needed  - Continue to monitor

## 2024-10-31 NOTE — PROGRESS NOTE ADULT - ATTENDING COMMENTS
Agree with the plan outlined as above.   Patient is on room air, comfortable.  Follow-up cytology and culture results.      Pulmonary medicine will sign off.

## 2024-10-31 NOTE — PROGRESS NOTE ADULT - ASSESSMENT
Ms. Brothers is a 44-year-old female with history of chronic bronchitis since childhood (on inhalers, rarely takes), anemia,  EtOH abuse (no h/o seizure, withdrawal, intubation)and cirrhosis presents with pleuritic chest pain for 1 day.  Patient states that she has had paracentesis and thoracentesis once before approximately 3 years ago, currently being managed with 'water pills' and she is concerned she has fluid in her lungs again.  Pulmonology consulted for evaluation of unilateral pleural effusion.     Additional pulmonary history:  Patient notes she had a chest tube placed about 2 years ago with [facility], and it was felt that the effusion was related to liver disease. Has not had any repeat procedures since then. Does feel dyspnea but improving with diuretics. She denies fevers, chills, productive cough, weight loss. She does not have a pulmonologist. On bedside ultrasound assessment there is a large simple pleural effusion. No ascites appreciated     Differentials for this effusion includes transudative 2/2 cirrhosis, and there is a possibility of spontaneous bacterial empyema in setting of cirrhosis although ultrasound features are simple. Hepatohydrothorax is less likely without large ascites. Other differentials that are less likely include parapneumonic effusion, and malignant effusion.    Plan:  - Diagnostic thoracentesis performed, lights criteria showing evidence of exudative effusion though notably Serum protein albumin gradient/serum-pleural protein gradient appears to agree with pseudoexudative effusion which makes sense clinically in this patient.   - Follow up cell count, cultures.  - Pulmonary to follow.   - appreciate GI recs  - obtain records from [place] if possible regarding pleural fluid studies    Patient was seen, evaluated and discussed with PCCM attending.  Please page pulmonary if there are any questions with above recommendations.  Ms. Brothers is a 44-year-old female with history of chronic bronchitis since childhood (on inhalers, rarely takes), anemia,  EtOH abuse (no h/o seizure, withdrawal, intubation)and cirrhosis presents with pleuritic chest pain for 1 day.  Patient states that she has had paracentesis and thoracentesis once before approximately 3 years ago, currently being managed with 'water pills' and she is concerned she has fluid in her lungs again.  Pulmonology consulted for evaluation of unilateral pleural effusion.     Additional pulmonary history:  Patient notes she had a chest tube placed about 2 years ago with [facility], and it was felt that the effusion was related to liver disease. Has not had any repeat procedures since then. Does feel dyspnea but improving with diuretics. She denies fevers, chills, productive cough, weight loss. She does not have a pulmonologist. On bedside ultrasound assessment there is a large simple pleural effusion. No ascites appreciated     Differentials for this effusion includes transudative 2/2 cirrhosis, and there is a possibility of spontaneous bacterial empyema in setting of cirrhosis although ultrasound features are simple. Hepatohydrothorax is less likely without large ascites. Other differentials that are less likely include parapneumonic effusion, and malignant effusion.    Plan:  - Diagnostic thoracentesis performed, lights criteria showing evidence of exudative effusion though notably Serum protein albumin gradient/serum-pleural protein gradient appears to agree with pseudoexudative effusion which makes sense clinically in this patient.   - Cell count showing lymphocyte predominance. Spontaneous bacterial empyema ruled out (neutrophils <500).   - Follow up cytology and fluid cultures.     Pulm team will sign off. Patient was seen, evaluated and discussed with PCCM attending.  Please re-consult pulmonary if there are any further questions.  Ms. Brothers is a 44-year-old female with history of chronic bronchitis since childhood (on inhalers, rarely takes), anemia,  EtOH abuse (no h/o seizure, withdrawal, intubation)and cirrhosis presents with pleuritic chest pain for 1 day.  Patient states that she has had paracentesis and thoracentesis once before approximately 3 years ago, currently being managed with 'water pills' and she is concerned she has fluid in her lungs again.  Pulmonology consulted for evaluation of unilateral pleural effusion.     Additional pulmonary history:  Patient notes she had a chest tube placed about 2 years ago with [facility], and it was felt that the effusion was related to liver disease. Has not had any repeat procedures since then. Does feel dyspnea but improving with diuretics. She denies fevers, chills, productive cough, weight loss. She does not have a pulmonologist. On bedside ultrasound assessment there is a large simple pleural effusion. No ascites appreciated. Currently appears comfortable on room air. Not on antibiotics at this time.    Effusion is mostly likely transudative 2/2 cirrhosis. Cell count showing lymphocyte predominance. Spontaneous bacterial empyema is ruled out with neutrophils <500. Other differentials that are less likely include parapneumonic effusion and malignant effusion.    Plan:  - Diagnostic thoracentesis performed, lights criteria showing evidence of exudative effusion though notably Serum protein albumin gradient/serum-pleural protein gradient appears to agree with pseudoexudative effusion which makes sense clinically in this patient.   - Follow up cytology and fluid cultures.     Pulm team will sign off. Patient was seen, evaluated and discussed with PCCM attending.  Please re-consult pulmonary if there are any further questions.

## 2024-10-31 NOTE — PROGRESS NOTE ADULT - REASON FOR ADMISSION
etOH intoxication and frequent falls

## 2024-10-31 NOTE — PROGRESS NOTE ADULT - PROBLEM SELECTOR PLAN 2
PMH: bilateral pleural effusions during a hospitalization 3 years ago requiring IV diuretics and thoracentesis  Presentation: reported increased SOB for the past 2 days, requiring 2L NC O2.   Etiology: could be secondary to cirrhosis however suspicion is lower given only trace ascites present, will order ECHO to rule out cardiovascular causes  CXR: Small to moderate right pleural effusion with right basilar opacification compatible with atelectasis or pneumonia. The left lung is clear. The heart is normal in size.  CT angio chest: No pulmonary embolism. Moderate right pleural effusion with atelectatic changes. Cirrhotic liver with indeterminate hypodense lesions. Trace ascites. Consider follow-up MRI.  S/p thoracentesis 10/30 with drainage of 1.1L fluid. Post-procedure POCUS with creased effusion size.  Physical exam with improved air movement in right lower lung base  Pleural fluid culture: polymorphonuclear leukocytes seen, no organisms seen by cytocentrifuge  Pleural fluid: , amylase 40, glucose 104, protein 3.7    Plan:  - F/u pleural fluid studies  - Continue home Lasix 20mg  - PT eval PMH: bilateral pleural effusions during a hospitalization 3 years ago requiring IV diuretics and thoracentesis  Presentation: reported increased SOB for the past 2 days, requiring 2L NC O2.   Etiology: could be secondary to cirrhosis however suspicion is lower given only trace ascites present, will order ECHO to rule out cardiovascular causes  CXR: Small to moderate right pleural effusion with right basilar opacification compatible with atelectasis or pneumonia. The left lung is clear. The heart is normal in size.  CT angio chest: No pulmonary embolism. Moderate right pleural effusion with atelectatic changes. Cirrhotic liver with indeterminate hypodense lesions. Trace ascites. Consider follow-up MRI.  S/p thoracentesis 10/30 with drainage of 1.1L fluid. Post-procedure POCUS with creased effusion size.  Physical exam with improved air movement in right lower lung base  Pleural fluid culture: polymorphonuclear leukocytes seen, no organisms seen by cytocentrifuge  Pleural fluid: , amylase 40, glucose 104, protein 3.7    Plan:  - F/u pleural fluid studies, per pulm likely pseudoexudative effusion  - Continue home Lasix 20mg  - PT eval

## 2024-10-31 NOTE — PROGRESS NOTE ADULT - PROBLEM SELECTOR PLAN 9
Presentation: K 3.6 in ED, down to 3.4 after admission  Etiology: could be secondary to refeeding syndrome  S/p potassium phosphate / sodium phosphate Powder one packet 10/29 and 10/30  4.0 this AM - resolved    Plan:  - Nutrition following, should consider to d/c oral nutrition supplements and add Consistent Carbohydrate to diet order if refeeding syndrome suspected  - Replete as needed, maintain K > 4 given going up on diuresis  - Continue to monitor

## 2024-10-31 NOTE — DISCHARGE NOTE NURSING/CASE MANAGEMENT/SOCIAL WORK - PATIENT PORTAL LINK FT
You can access the FollowMyHealth Patient Portal offered by [facility] by registering at the following website: [email or web address]. By joining [employer]’s FollowMyHealth portal, you will also be able to view your health information using other applications (apps) compatible with our system.

## 2024-10-31 NOTE — PROGRESS NOTE ADULT - SUBJECTIVE AND OBJECTIVE BOX
PULMONARY CONSULT SERVICE FOLLOW-UP NOTE    INTERVAL HPI:  Reviewed chart and overnight events; patient seen and examined at bedside.    MEDICATIONS:  Pulmonary:    Antimicrobials:    Anticoagulants:  enoxaparin Injectable 40 milliGRAM(s) SubCutaneous every 24 hours    Cardiac:  furosemide    Tablet 20 milliGRAM(s) Oral every 24 hours  spironolactone 50 milliGRAM(s) Oral every 24 hours      Allergies    penicillins (Rash; Urticaria)    Intolerances        Vital Signs Last 24 Hrs  T(C): 37.3 (31 Oct 2024 09:09), Max: 37.3 (31 Oct 2024 09:09)  T(F): 99.2 (31 Oct 2024 09:09), Max: 99.2 (31 Oct 2024 09:09)  HR: 81 (31 Oct 2024 09:09) (81 - 95)  BP: 106/71 (31 Oct 2024 09:09) (97/65 - 117/74)  BP(mean): 81 (31 Oct 2024 06:05) (81 - 81)  RR: 17 (31 Oct 2024 09:09) (17 - 18)  SpO2: 98% (31 Oct 2024 09:09) (96% - 100%)    Parameters below as of 31 Oct 2024 09:09  Patient On (Oxygen Delivery Method): room air            PHYSICAL EXAM:  Constitutional: NAD  HEENT: NC/AT; PERRL, anicteric sclera; MMM  Neck: supple  Cardiovascular: +S1/S2, RRR  Respiratory: CTA B/L; no W/R/R  Gastrointestinal: soft, NT/ND  Extremities: WWP; no edema, clubbing or cyanosis  Vascular: 2+ radial pulses B/L  Neurological: awake and alert; CHAHAL    LABS:      CBC Full  -  ( 31 Oct 2024 08:18 )  WBC Count : 8.25 K/uL  RBC Count : 2.97 M/uL  Hemoglobin : 10.9 g/dL  Hematocrit : 34.0 %  Platelet Count - Automated : 185 K/uL  Mean Cell Volume : 114.5 fl  Mean Cell Hemoglobin : 36.7 pg  Mean Cell Hemoglobin Concentration : 32.1 g/dL  Auto Neutrophil # : 5.64 K/uL  Auto Lymphocyte # : 1.90 K/uL  Auto Monocyte # : 0.50 K/uL  Auto Eosinophil # : 0.08 K/uL  Auto Basophil # : 0.10 K/uL  Auto Neutrophil % : 68.3 %  Auto Lymphocyte % : 23.0 %  Auto Monocyte % : 6.1 %  Auto Eosinophil % : 1.0 %  Auto Basophil % : 1.2 %    10-31    130[L]  |  101  |  7   ----------------------------<  88  4.0   |  18[L]  |  0.47[L]    Ca    8.5      31 Oct 2024 08:18  Phos  3.2     10-31  Mg     1.4     10-31    TPro  7.9  /  Alb  3.1[L]  /  TBili  4.3[H]  /  DBili  x   /  AST  109[H]  /  ALT  14  /  AlkPhos  229[H]  10-31    PT/INR - ( 31 Oct 2024 08:18 )   PT: 20.2 sec;   INR: 1.77          PTT - ( 31 Oct 2024 08:18 )  PTT:38.1 sec      Urinalysis Basic - ( 31 Oct 2024 08:18 )    Color: x / Appearance: x / SG: x / pH: x  Gluc: 88 mg/dL / Ketone: x  / Bili: x / Urobili: x   Blood: x / Protein: x / Nitrite: x   Leuk Esterase: x / RBC: x / WBC x   Sq Epi: x / Non Sq Epi: x / Bacteria: x        RADIOLOGY & ADDITIONAL STUDIES: PULMONARY CONSULT SERVICE FOLLOW-UP NOTE    INTERVAL HPI:  Reviewed chart and overnight events; patient seen and examined at bedside. Feels well with no complaints.     MEDICATIONS:  Pulmonary:    Antimicrobials:    Anticoagulants:  enoxaparin Injectable 40 milliGRAM(s) SubCutaneous every 24 hours    Cardiac:  furosemide    Tablet 20 milliGRAM(s) Oral every 24 hours  spironolactone 50 milliGRAM(s) Oral every 24 hours      Allergies    penicillins (Rash; Urticaria)    Intolerances        Vital Signs Last 24 Hrs  T(C): 37.3 (31 Oct 2024 09:09), Max: 37.3 (31 Oct 2024 09:09)  T(F): 99.2 (31 Oct 2024 09:09), Max: 99.2 (31 Oct 2024 09:09)  HR: 81 (31 Oct 2024 09:09) (81 - 95)  BP: 106/71 (31 Oct 2024 09:09) (97/65 - 117/74)  BP(mean): 81 (31 Oct 2024 06:05) (81 - 81)  RR: 17 (31 Oct 2024 09:09) (17 - 18)  SpO2: 98% (31 Oct 2024 09:09) (96% - 100%)    Parameters below as of 31 Oct 2024 09:09  Patient On (Oxygen Delivery Method): room air            PHYSICAL EXAM:  Constitutional: NAD  HEENT: NC/AT; PERRL, anicteric sclera; MMM  Neck: supple  Cardiovascular: +S1/S2, RRR  Respiratory: CTA B/L; no W/R/R  Gastrointestinal: soft, NT/ND  Extremities: WWP; no edema, clubbing or cyanosis  Vascular: 2+ radial pulses B/L  Neurological: awake and alert; CHAHAL    LABS:      CBC Full  -  ( 31 Oct 2024 08:18 )  WBC Count : 8.25 K/uL  RBC Count : 2.97 M/uL  Hemoglobin : 10.9 g/dL  Hematocrit : 34.0 %  Platelet Count - Automated : 185 K/uL  Mean Cell Volume : 114.5 fl  Mean Cell Hemoglobin : 36.7 pg  Mean Cell Hemoglobin Concentration : 32.1 g/dL  Auto Neutrophil # : 5.64 K/uL  Auto Lymphocyte # : 1.90 K/uL  Auto Monocyte # : 0.50 K/uL  Auto Eosinophil # : 0.08 K/uL  Auto Basophil # : 0.10 K/uL  Auto Neutrophil % : 68.3 %  Auto Lymphocyte % : 23.0 %  Auto Monocyte % : 6.1 %  Auto Eosinophil % : 1.0 %  Auto Basophil % : 1.2 %    10-31    130[L]  |  101  |  7   ----------------------------<  88  4.0   |  18[L]  |  0.47[L]    Ca    8.5      31 Oct 2024 08:18  Phos  3.2     10-31  Mg     1.4     10-31    TPro  7.9  /  Alb  3.1[L]  /  TBili  4.3[H]  /  DBili  x   /  AST  109[H]  /  ALT  14  /  AlkPhos  229[H]  10-31    PT/INR - ( 31 Oct 2024 08:18 )   PT: 20.2 sec;   INR: 1.77          PTT - ( 31 Oct 2024 08:18 )  PTT:38.1 sec      Urinalysis Basic - ( 31 Oct 2024 08:18 )    Color: x / Appearance: x / SG: x / pH: x  Gluc: 88 mg/dL / Ketone: x  / Bili: x / Urobili: x   Blood: x / Protein: x / Nitrite: x   Leuk Esterase: x / RBC: x / WBC x   Sq Epi: x / Non Sq Epi: x / Bacteria: x        RADIOLOGY & ADDITIONAL STUDIES: reviewed

## 2024-10-31 NOTE — PROGRESS NOTE ADULT - PROBLEM SELECTOR PLAN 5
PMH: no history of pulmonary embolism or DVT. Not on anticoagulation  Etiology: likely secondary to alcoholic cirrhosis and in the presence of moderate right pleural effusion  [id number]  CTA chest: negative for PE    Plan:  - Monitor Cimetidine Pregnancy And Lactation Text: This medication is Pregnancy Category B and is considered safe during pregnancy. It is also excreted in breast milk and breast feeding isn't recommended.

## 2024-10-31 NOTE — DISCHARGE NOTE NURSING/CASE MANAGEMENT/SOCIAL WORK - NSDCPEFALRISK_GEN_ALL_CORE
For information on Fall & Injury Prevention, visit: [email or web address] OR  [email or web address] OR  https://www.cdc.gov/steadi/patient.html

## 2024-10-31 NOTE — PROGRESS NOTE ADULT - PROBLEM SELECTOR PLAN 1
PMH: Alcoholic cirrhosis (last U/S 6 months ago), on Lasix 20mg/Spirinolactone 25mg at home reports noncompliance, follows with Dr. Bonita Esquivel [facility] [place]. EGD 2 months ago and no history of varices. Most recent BM yesterday.   Presentation: No asterixis, encephalopathy, bleeding; AG 7, Alb 2.4, TBil 2.5, Alk Phos 224, , ALT 23  CXR: Small to moderate right pleural effusion with right basilar opacification compatible with atelectasis or pneumonia. The left lung is clear. The heart is normal in size.  CT angio chest: No pulmonary embolism. Moderate right pleural effusion with atelectatic changes. Cirrhotic liver with indeterminate hypodense lesions. Trace ascites.  CT head: negative  Abdominal US: Bidirectional flow in the main portal vein suggestive of portal hypertension. No evidence of flow in the right portal vein. Thrombosis of the right portal vein cannot be excluded. Cirrhosis. Borderline hepatomegaly  Hepatitis panel negative, ECHO 10/29: normal left and right ventricular size and systolic function.  MELD 20    Plan:  - Outpatient MRI with Dr. Esquivel   - Daily MELD labs (CMP, Coags)  - Hepatology consult, rec PETH, AFP level,  liver MRI, daily weights, urine sodium, daily Is and Os, lactulose titrated to 1 BM per day  - Replete K > 4 and Mg >2  - Continue home Lasix 20mg and increased spironolactone to 50mg daily  - Nutrition Consult, concern for refeeding syndrome if lytes continue to drop despite repletion           - Should consider to d/c oral nutrition supplements and add Consistent Carbohydrate to diet order  - High protein diet  - Low Na diet for ascites

## 2024-10-31 NOTE — DISCHARGE NOTE NURSING/CASE MANAGEMENT/SOCIAL WORK - FINANCIAL ASSISTANCE
[facility] provides services at a reduced cost to those who are determined to be eligible through [facility]’s financial assistance program. Information regarding [facility]’s financial assistance program can be found by going to [email or web address] or by calling [phone].

## 2024-10-31 NOTE — PROGRESS NOTE ADULT - SUBJECTIVE AND OBJECTIVE BOX
Patient is a 44y old  Female who presents with a chief complaint of etOH intoxication and frequent falls (30 Oct 2024 17:54)      HOSPITAL COURSE:     OVERNIGHT EVENTS:    SUBJECTIVE:     ROS: otherwise negative      T(C): 36.9 (10-31-24 @ 06:05), Max: 36.9 (10-30-24 @ 09:48)  HR: 90 (10-31-24 @ 06:05) (84 - 95)  BP: 104/69 (10-31-24 @ 06:05) (97/65 - 124/84)  RR: 17 (10-31-24 @ 06:05) (17 - 18)  SpO2: 97% (10-31-24 @ 06:05) (96% - 100%)  Wt(kg): --Vital Signs Last 24 Hrs  T(C): 36.9 (31 Oct 2024 06:05), Max: 36.9 (30 Oct 2024 09:48)  T(F): 98.4 (31 Oct 2024 06:05), Max: 98.5 (30 Oct 2024 09:48)  HR: 90 (31 Oct 2024 06:05) (84 - 95)  BP: 104/69 (31 Oct 2024 06:05) (97/65 - 124/84)  BP(mean): 81 (31 Oct 2024 06:05) (81 - 81)  RR: 17 (31 Oct 2024 06:05) (17 - 18)  SpO2: 97% (31 Oct 2024 06:05) (96% - 100%)    Parameters below as of 31 Oct 2024 06:05  Patient On (Oxygen Delivery Method): room air        PHYSICAL EXAM:  Constitutional: resting comfortably in bed; NAD  Head: NC/AT  Eyes: PERRL, EOMI, anicteric sclera  ENT: no nasal discharge; MMM  Neck: supple; no JVD or thyromegaly  Respiratory: CTA B/L; no W/R/R, no retractions  Cardiac: +S1/S2; RRR; no M/R/G  Gastrointestinal: soft, NT/ND; no rebound or guarding; +BSx4  Back: spine midline, no bony tenderness or step-offs; no CVAT B/L  Extremities: WWP, no clubbing or cyanosis; no peripheral edema. Capillary refill <2 sec  Musculoskeletal: NROM x4; no joint swelling, tenderness or erythema  Vascular: 2+ radial, DP/PT pulses B/L  Dermatologic: skin warm, dry and intact; no rashes, wounds, or scars  Lymphatic: no submandibular or cervical LAD  Neurologic: AAOx3; CNII-XII grossly intact; no focal deficits  Psychiatric: affect and characteristics of appearance, verbalizations, behaviors are appropriate    LABS:    10-30    134[L]  |  102  |  6[L]  ----------------------------<  71  3.7   |  23  |  0.39[L]    Ca    8.3[L]      30 Oct 2024 05:30  Phos  2.5     10-30  Mg     1.7     10-30    TPro  7.1  /  Alb  2.9[L]  /  TBili  4.8[H]  /  DBili  x   /  AST  109[H]  /  ALT  14  /  AlkPhos  220[H]  10-30      PT/INR - ( 30 Oct 2024 05:30 )   PT: 19.5 sec;   INR: 1.68          PTT - ( 30 Oct 2024 05:30 )  PTT:39.2 sec  Urinalysis Basic - ( 30 Oct 2024 05:30 )    Color: x / Appearance: x / SG: x / pH: x  Gluc: 71 mg/dL / Ketone: x  / Bili: x / Urobili: x   Blood: x / Protein: x / Nitrite: x   Leuk Esterase: x / RBC: x / WBC x   Sq Epi: x / Non Sq Epi: x / Bacteria: x      CAPILLARY BLOOD GLUCOSE            Urinalysis Basic - ( 30 Oct 2024 05:30 )    Color: x / Appearance: x / SG: x / pH: x  Gluc: 71 mg/dL / Ketone: x  / Bili: x / Urobili: x   Blood: x / Protein: x / Nitrite: x   Leuk Esterase: x / RBC: x / WBC x   Sq Epi: x / Non Sq Epi: x / Bacteria: x        MEDICATIONS  (STANDING):  enoxaparin Injectable 40 milliGRAM(s) SubCutaneous every 24 hours  folic acid 1 milliGRAM(s) Oral daily  furosemide    Tablet 20 milliGRAM(s) Oral every 24 hours  influenza   Vaccine 0.5 milliLiter(s) IntraMuscular once  multivitamin 1 Tablet(s) Oral daily  spironolactone 50 milliGRAM(s) Oral every 24 hours  thiamine IVPB 500 milliGRAM(s) IV Intermittent every 8 hours    MEDICATIONS  (PRN):  LORazepam     Tablet 1 milliGRAM(s) Oral at bedtime PRN for anxiety      RADIOLOGY & ADDITIONAL TESTS: Reviewed   INTERVAL HPI/OVERNIGHT EVENTS: Patient seen and examined at bedside, resting comfortably in bed, and does not appear to be in any acute distress. Patient reports significant improvement to right-sided pleuritic chest pain s/p thoracentesis yesterday. She is saturating well on RA. Denies SOB, palpitations, dyspnea, fevers, night sweats, chills, n/v/d/c, dysuria, and urinary frequency.     ROS: otherwise negative    T(C): 36.9 (10-31-24 @ 06:05), Max: 36.9 (10-30-24 @ 09:48)  HR: 90 (10-31-24 @ 06:05) (84 - 95)  BP: 104/69 (10-31-24 @ 06:05) (97/65 - 124/84)  RR: 17 (10-31-24 @ 06:05) (17 - 18)  SpO2: 97% (10-31-24 @ 06:05) (96% - 100%)  Wt(kg): --Vital Signs Last 24 Hrs  T(C): 36.9 (31 Oct 2024 06:05), Max: 36.9 (30 Oct 2024 09:48)  T(F): 98.4 (31 Oct 2024 06:05), Max: 98.5 (30 Oct 2024 09:48)  HR: 90 (31 Oct 2024 06:05) (84 - 95)  BP: 104/69 (31 Oct 2024 06:05) (97/65 - 124/84)  BP(mean): 81 (31 Oct 2024 06:05) (81 - 81)  RR: 17 (31 Oct 2024 06:05) (17 - 18)  SpO2: 97% (31 Oct 2024 06:05) (96% - 100%)    Parameters below as of 31 Oct 2024 06:05  Patient On (Oxygen Delivery Method): room air    PHYSICAL EXAM:  General: bruising present over entire body, notably left periorbital and frontal ecchymosis, variable sized circular bruises on bilateral upper extremities, bilateral anterior shins, and posterior thighs  Eyes: pupils equal and reactive to light, EOMI intact bilaterally. Anicteric sclerae, moist conjunctivae  HENT: Moist mucous membranes  Neck: Trachea midline, supple  Lungs: CTA B/L. No wheezes, rales, or rhonchi  Cardiovascular: RRR. No murmurs, rubs, or gallops  Abdomen: moderately distended abdomen; R>L flank dullness to percussion; shifting dullness present, absence of fluid wave; liver edge palpated below right costal margin; No rebound or guarding  Extremities: WWP, No clubbing, cyanosis or edema  Neurological: Alert and oriented  Skin: Warm and dry. bruises as noted above    LABS:                          10.9   8.25  )-----------( 185      ( 31 Oct 2024 08:18 )             34.0     10-31    130[L]  |  101  |  7   ----------------------------<  88  4.0   |  18[L]  |  0.47[L]    Ca    8.5      31 Oct 2024 08:18  Phos  3.2     10-31  Mg     1.4     10-31    TPro  7.9  /  Alb  3.1[L]  /  TBili  4.3[H]  /  DBili  x   /  AST  109[H]  /  ALT  14  /  AlkPhos  229[H]  10-31     PTT - ( 30 Oct 2024 05:30 )  PTT:39.2 sec  Urinalysis Basic - ( 30 Oct 2024 05:30     Urinalysis Basic - ( 30 Oct 2024 05:30 )    Color: x / Appearance: x / SG: x / pH: x  Gluc: 71 mg/dL / Ketone: x  / Bili: x / Urobili: x   Blood: x / Protein: x / Nitrite: x   Leuk Esterase: x / RBC: x / WBC x   Sq Epi: x / Non Sq Epi: x / Bacteria: x    MEDICATIONS  (STANDING):  enoxaparin Injectable 40 milliGRAM(s) SubCutaneous every 24 hours  folic acid 1 milliGRAM(s) Oral daily  furosemide    Tablet 20 milliGRAM(s) Oral every 24 hours  influenza   Vaccine 0.5 milliLiter(s) IntraMuscular once  multivitamin 1 Tablet(s) Oral daily  spironolactone 50 milliGRAM(s) Oral every 24 hours  thiamine IVPB 500 milliGRAM(s) IV Intermittent every 8 hours    MEDICATIONS  (PRN):  LORazepam     Tablet 1 milliGRAM(s) Oral at bedtime PRN for anxiety      RADIOLOGY & ADDITIONAL TESTS: Reviewed

## 2024-10-31 NOTE — PROGRESS NOTE ADULT - SUBJECTIVE AND OBJECTIVE BOX
*** DRAFT NOTE ***    HEPATOLOGY PROGRESS NOTE    INTERVAL/SUBJECTIVE:    Allergies    penicillins (Rash; Urticaria)    Intolerances      MEDICATIONS:  MEDICATIONS  (STANDING):  enoxaparin Injectable 40 milliGRAM(s) SubCutaneous every 24 hours  folic acid 1 milliGRAM(s) Oral daily  furosemide    Tablet 20 milliGRAM(s) Oral every 24 hours  influenza   Vaccine 0.5 milliLiter(s) IntraMuscular once  multivitamin 1 Tablet(s) Oral daily  spironolactone 50 milliGRAM(s) Oral every 24 hours  thiamine IVPB 500 milliGRAM(s) IV Intermittent every 8 hours    MEDICATIONS  (PRN):  LORazepam     Tablet 1 milliGRAM(s) Oral at bedtime PRN for anxiety    Vital Signs Last 24 Hrs  T(C): 37.3 (31 Oct 2024 09:09), Max: 37.3 (31 Oct 2024 09:09)  T(F): 99.2 (31 Oct 2024 09:09), Max: 99.2 (31 Oct 2024 09:09)  HR: 81 (31 Oct 2024 09:09) (81 - 95)  BP: 106/71 (31 Oct 2024 09:09) (97/65 - 117/74)  BP(mean): 81 (31 Oct 2024 06:05) (81 - 81)  RR: 17 (31 Oct 2024 09:09) (17 - 18)  SpO2: 98% (31 Oct 2024 09:09) (96% - 100%)    Parameters below as of 31 Oct 2024 09:09  Patient On (Oxygen Delivery Method): room air        PHYSICAL EXAM:  General: Alert, no acute distress  Lungs: Normal respiratory effort  Abdomen: Soft, nondistended, nontender  Extremities: *** edema  Neurological: Moving all extremities spontaneously***, *** asterixis    LABS:                        10.9   8.25  )-----------( 185      ( 31 Oct 2024 08:18 )             34.0     10-31    130[L]  |  101  |  7   ----------------------------<  88  4.0   |  18[L]  |  0.47[L]    Ca    8.5      31 Oct 2024 08:18  Phos  3.2     10-31  Mg     1.4     10-31    TPro  7.9  /  Alb  3.1[L]  /  TBili  4.3[H]  /  DBili  x   /  AST  109[H]  /  ALT  14  /  AlkPhos  229[H]  10-31    PT/INR - ( 31 Oct 2024 08:18 )   PT: 20.2 sec;   INR: 1.77          PTT - ( 31 Oct 2024 08:18 )  PTT:38.1 sec      RADIOLOGY & ADDITIONAL STUDIES:  Reviewed HEPATOLOGY PROGRESS NOTE    INTERVAL/SUBJECTIVE:  - CT canceled as hCG borderline positive  - AFP returned elevated    Pt feels well today without acute complaints.     Allergies  penicillins (Rash; Urticaria)    Intolerances      MEDICATIONS:  MEDICATIONS  (STANDING):  enoxaparin Injectable 40 milliGRAM(s) SubCutaneous every 24 hours  folic acid 1 milliGRAM(s) Oral daily  furosemide    Tablet 20 milliGRAM(s) Oral every 24 hours  influenza   Vaccine 0.5 milliLiter(s) IntraMuscular once  multivitamin 1 Tablet(s) Oral daily  spironolactone 50 milliGRAM(s) Oral every 24 hours  thiamine IVPB 500 milliGRAM(s) IV Intermittent every 8 hours    MEDICATIONS  (PRN):  LORazepam     Tablet 1 milliGRAM(s) Oral at bedtime PRN for anxiety    Vital Signs Last 24 Hrs  T(C): 37.3 (31 Oct 2024 09:09), Max: 37.3 (31 Oct 2024 09:09)  T(F): 99.2 (31 Oct 2024 09:09), Max: 99.2 (31 Oct 2024 09:09)  HR: 81 (31 Oct 2024 09:09) (81 - 95)  BP: 106/71 (31 Oct 2024 09:09) (97/65 - 117/74)  BP(mean): 81 (31 Oct 2024 06:05) (81 - 81)  RR: 17 (31 Oct 2024 09:09) (17 - 18)  SpO2: 98% (31 Oct 2024 09:09) (96% - 100%)    Parameters below as of 31 Oct 2024 09:09  Patient On (Oxygen Delivery Method): room air        PHYSICAL EXAM:  General: Alert, no acute distress  Lungs: Normal respiratory effort on RA  Abdomen: Soft, nondistended, nontender  Extremities: No edema  Neurological: Moving all extremities spontaneously, no asterixis    LABS:                        10.9   8.25  )-----------( 185      ( 31 Oct 2024 08:18 )             34.0     10-31    130[L]  |  101  |  7   ----------------------------<  88  4.0   |  18[L]  |  0.47[L]    Ca    8.5      31 Oct 2024 08:18  Phos  3.2     10-31  Mg     1.4     10-31    TPro  7.9  /  Alb  3.1[L]  /  TBili  4.3[H]  /  DBili  x   /  AST  109[H]  /  ALT  14  /  AlkPhos  229[H]  10-31    PT/INR - ( 31 Oct 2024 08:18 )   PT: 20.2 sec;   INR: 1.77          PTT - ( 31 Oct 2024 08:18 )  PTT:38.1 sec      RADIOLOGY & ADDITIONAL STUDIES:  Reviewed

## 2024-10-31 NOTE — PROGRESS NOTE ADULT - PROBLEM SELECTOR PLAN 4
PMH: R-sided pleuritic CP without radiation, no palpitations; no significant cardiac history  Etiology: likely secondary to pressure/fluid buildup due to right pleural effusion  EKG in ED (10/28): TWI in Lead III, [id number]  EKG repeat: TWI in Lead III  Trop 5 to 7  Significant improvement s/p thoracentesis on 10/30    Plan:  - Continue to monitor  - Per hepatology, for pain can use Tylenol up to 2g/day but avoid NSAIDs and limit opioids

## 2024-11-01 LAB — NON-GYNECOLOGICAL CYTOLOGY STUDY: SIGNIFICANT CHANGE UP

## 2024-11-02 LAB — ADENOSINE DEAMINASE PLR-CCNC: 4 U/L — SIGNIFICANT CHANGE UP (ref 0–30)

## 2024-11-04 LAB
CULTURE RESULTS: SIGNIFICANT CHANGE UP
PETH 16:0/18:1: >400 NG/ML — HIGH
PETH 16:0/18:2: >400 NG/ML — HIGH
PETH COMMENTS: SIGNIFICANT CHANGE UP
SPECIMEN SOURCE: SIGNIFICANT CHANGE UP

## 2024-11-06 LAB — DRUG SCREEN, SERUM: SIGNIFICANT CHANGE UP

## 2024-11-09 DIAGNOSIS — D68.4 ACQUIRED COAGULATION FACTOR DEFICIENCY: ICD-10-CM

## 2024-11-09 DIAGNOSIS — K76.6 PORTAL HYPERTENSION: ICD-10-CM

## 2024-11-09 DIAGNOSIS — E83.39 OTHER DISORDERS OF PHOSPHORUS METABOLISM: ICD-10-CM

## 2024-11-09 DIAGNOSIS — F10.20 ALCOHOL DEPENDENCE, UNCOMPLICATED: ICD-10-CM

## 2024-11-09 DIAGNOSIS — R07.89 OTHER CHEST PAIN: ICD-10-CM

## 2024-11-09 DIAGNOSIS — T50.1X6A UNDERDOSING OF LOOP [HIGH-CEILING] DIURETICS, INITIAL ENCOUNTER: ICD-10-CM

## 2024-11-09 DIAGNOSIS — Z88.0 ALLERGY STATUS TO PENICILLIN: ICD-10-CM

## 2024-11-09 DIAGNOSIS — D64.89 OTHER SPECIFIED ANEMIAS: ICD-10-CM

## 2024-11-09 DIAGNOSIS — F17.210 NICOTINE DEPENDENCE, CIGARETTES, UNCOMPLICATED: ICD-10-CM

## 2024-11-09 DIAGNOSIS — J42 UNSPECIFIED CHRONIC BRONCHITIS: ICD-10-CM

## 2024-11-09 DIAGNOSIS — E87.6 HYPOKALEMIA: ICD-10-CM

## 2024-11-09 DIAGNOSIS — J90 PLEURAL EFFUSION, NOT ELSEWHERE CLASSIFIED: ICD-10-CM

## 2024-11-09 DIAGNOSIS — F41.9 ANXIETY DISORDER, UNSPECIFIED: ICD-10-CM
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
## 2024-11-09 DIAGNOSIS — K70.31 ALCOHOLIC CIRRHOSIS OF LIVER WITH ASCITES: ICD-10-CM

## 2024-11-09 DIAGNOSIS — Z91.148 PATIENT'S OTHER NONCOMPLIANCE WITH MEDICATION REGIMEN FOR OTHER REASON: ICD-10-CM

## 2025-08-12 ENCOUNTER — EMERGENCY (EMERGENCY)
Facility: HOSPITAL | Age: 45
LOS: 1 days | End: 2025-08-12
Attending: EMERGENCY MEDICINE | Admitting: EMERGENCY MEDICINE
Payer: MEDICAID

## 2025-08-12 VITALS
RESPIRATION RATE: 18 BRPM | OXYGEN SATURATION: 97 % | DIASTOLIC BLOOD PRESSURE: 74 MMHG | SYSTOLIC BLOOD PRESSURE: 111 MMHG | TEMPERATURE: 98 F | HEART RATE: 84 BPM

## 2025-08-12 DIAGNOSIS — M25.512 PAIN IN LEFT SHOULDER: ICD-10-CM

## 2025-08-12 DIAGNOSIS — Z88.0 ALLERGY STATUS TO PENICILLIN: ICD-10-CM

## 2025-08-12 DIAGNOSIS — F17.210 NICOTINE DEPENDENCE, CIGARETTES, UNCOMPLICATED: ICD-10-CM

## 2025-08-12 PROCEDURE — 99284 EMERGENCY DEPT VISIT MOD MDM: CPT

## 2025-08-12 RX ORDER — LIDOCAINE HYDROCHLORIDE 20 MG/ML
1 JELLY TOPICAL
Qty: 1 | Refills: 0
Start: 2025-08-12

## 2025-08-12 RX ORDER — ACETAMINOPHEN 500 MG/5ML
650 LIQUID (ML) ORAL ONCE
Refills: 0 | Status: COMPLETED | OUTPATIENT
Start: 2025-08-12 | End: 2025-08-12

## 2025-08-12 RX ORDER — LIDOCAINE HYDROCHLORIDE 20 MG/ML
1 JELLY TOPICAL ONCE
Refills: 0 | Status: COMPLETED | OUTPATIENT
Start: 2025-08-12 | End: 2025-08-12

## 2025-08-12 RX ADMIN — Medication 650 MILLIGRAM(S): at 18:47

## 2025-08-12 RX ADMIN — LIDOCAINE HYDROCHLORIDE 1 PATCH: 20 JELLY TOPICAL at 18:52

## 2025-08-13 PROBLEM — F10.20 ALCOHOL DEPENDENCE, UNCOMPLICATED: Chronic | Status: ACTIVE | Noted: 2024-10-28

## 2025-08-13 PROBLEM — K74.60 UNSPECIFIED CIRRHOSIS OF LIVER: Chronic | Status: ACTIVE | Noted: 2024-10-28

## 2025-08-13 PROBLEM — F17.200 NICOTINE DEPENDENCE, UNSPECIFIED, UNCOMPLICATED: Chronic | Status: ACTIVE | Noted: 2024-10-28

## 2025-08-13 PROBLEM — J42 UNSPECIFIED CHRONIC BRONCHITIS: Chronic | Status: ACTIVE | Noted: 2024-10-28

## 2025-09-15 ENCOUNTER — EMERGENCY (EMERGENCY)
Facility: HOSPITAL | Age: 45
LOS: 1 days | End: 2025-09-15
Attending: EMERGENCY MEDICINE | Admitting: EMERGENCY MEDICINE
Payer: MEDICAID

## 2025-09-15 VITALS
WEIGHT: 130.07 LBS | HEIGHT: 69 IN | TEMPERATURE: 99 F | DIASTOLIC BLOOD PRESSURE: 86 MMHG | OXYGEN SATURATION: 98 % | RESPIRATION RATE: 16 BRPM | SYSTOLIC BLOOD PRESSURE: 127 MMHG | HEART RATE: 84 BPM

## 2025-09-15 PROCEDURE — 99283 EMERGENCY DEPT VISIT LOW MDM: CPT

## 2025-09-18 DIAGNOSIS — Z88.0 ALLERGY STATUS TO PENICILLIN: ICD-10-CM

## 2025-09-18 DIAGNOSIS — F10.90 ALCOHOL USE, UNSPECIFIED, UNCOMPLICATED: ICD-10-CM

## 2025-09-18 DIAGNOSIS — F17.200 NICOTINE DEPENDENCE, UNSPECIFIED, UNCOMPLICATED: ICD-10-CM

## 2025-09-18 DIAGNOSIS — Z53.29 PROCEDURE AND TREATMENT NOT CARRIED OUT BECAUSE OF PATIENT'S DECISION FOR OTHER REASONS: ICD-10-CM
